# Patient Record
Sex: FEMALE | Race: WHITE | HISPANIC OR LATINO
[De-identification: names, ages, dates, MRNs, and addresses within clinical notes are randomized per-mention and may not be internally consistent; named-entity substitution may affect disease eponyms.]

---

## 2017-10-12 ENCOUNTER — RESULT REVIEW (OUTPATIENT)
Age: 32
End: 2017-10-12

## 2021-02-22 ENCOUNTER — INPATIENT (INPATIENT)
Facility: HOSPITAL | Age: 36
LOS: 1 days | Discharge: HOME | DRG: 310 | End: 2021-02-24
Attending: GENERAL PRACTICE | Admitting: GENERAL PRACTICE
Payer: COMMERCIAL

## 2021-02-22 ENCOUNTER — TRANSCRIPTION ENCOUNTER (OUTPATIENT)
Age: 36
End: 2021-02-22

## 2021-02-22 VITALS
DIASTOLIC BLOOD PRESSURE: 59 MMHG | HEIGHT: 65 IN | WEIGHT: 171.96 LBS | HEART RATE: 100 BPM | OXYGEN SATURATION: 99 % | TEMPERATURE: 98 F | SYSTOLIC BLOOD PRESSURE: 118 MMHG | RESPIRATION RATE: 18 BRPM

## 2021-02-22 DIAGNOSIS — I21.4 NON-ST ELEVATION (NSTEMI) MYOCARDIAL INFARCTION: ICD-10-CM

## 2021-02-22 DIAGNOSIS — I47.1 SUPRAVENTRICULAR TACHYCARDIA: ICD-10-CM

## 2021-02-22 DIAGNOSIS — Z29.9 ENCOUNTER FOR PROPHYLACTIC MEASURES, UNSPECIFIED: ICD-10-CM

## 2021-02-22 LAB
ALBUMIN SERPL ELPH-MCNC: 3.6 G/DL — SIGNIFICANT CHANGE UP (ref 3.5–5)
ALP SERPL-CCNC: 97 U/L — SIGNIFICANT CHANGE UP (ref 40–120)
ALT FLD-CCNC: 17 U/L DA — SIGNIFICANT CHANGE UP (ref 10–60)
AMPHET UR-MCNC: NEGATIVE — SIGNIFICANT CHANGE UP
ANION GAP SERPL CALC-SCNC: 4 MMOL/L — LOW (ref 5–17)
APPEARANCE UR: CLEAR — SIGNIFICANT CHANGE UP
AST SERPL-CCNC: 11 U/L — SIGNIFICANT CHANGE UP (ref 10–40)
BARBITURATES UR SCN-MCNC: NEGATIVE — SIGNIFICANT CHANGE UP
BASOPHILS # BLD AUTO: 0.02 K/UL — SIGNIFICANT CHANGE UP (ref 0–0.2)
BASOPHILS NFR BLD AUTO: 0.3 % — SIGNIFICANT CHANGE UP (ref 0–2)
BENZODIAZ UR-MCNC: NEGATIVE — SIGNIFICANT CHANGE UP
BILIRUB SERPL-MCNC: 0.2 MG/DL — SIGNIFICANT CHANGE UP (ref 0.2–1.2)
BILIRUB UR-MCNC: NEGATIVE — SIGNIFICANT CHANGE UP
BUN SERPL-MCNC: 14 MG/DL — SIGNIFICANT CHANGE UP (ref 7–18)
CALCIUM SERPL-MCNC: 8.2 MG/DL — LOW (ref 8.4–10.5)
CHLORIDE SERPL-SCNC: 109 MMOL/L — HIGH (ref 96–108)
CK MB BLD-MCNC: 4.3 % — HIGH (ref 0–3.5)
CK MB CFR SERPL CALC: 3.5 NG/ML — SIGNIFICANT CHANGE UP (ref 0–3.6)
CK SERPL-CCNC: 81 U/L — SIGNIFICANT CHANGE UP (ref 21–215)
CK SERPL-CCNC: 91 U/L — SIGNIFICANT CHANGE UP (ref 21–215)
CO2 SERPL-SCNC: 27 MMOL/L — SIGNIFICANT CHANGE UP (ref 22–31)
COCAINE METAB.OTHER UR-MCNC: NEGATIVE — SIGNIFICANT CHANGE UP
COLOR SPEC: YELLOW — SIGNIFICANT CHANGE UP
CREAT SERPL-MCNC: 0.62 MG/DL — SIGNIFICANT CHANGE UP (ref 0.5–1.3)
DIFF PNL FLD: NEGATIVE — SIGNIFICANT CHANGE UP
EOSINOPHIL # BLD AUTO: 0.12 K/UL — SIGNIFICANT CHANGE UP (ref 0–0.5)
EOSINOPHIL NFR BLD AUTO: 1.7 % — SIGNIFICANT CHANGE UP (ref 0–6)
GLUCOSE SERPL-MCNC: 87 MG/DL — SIGNIFICANT CHANGE UP (ref 70–99)
GLUCOSE UR QL: NEGATIVE — SIGNIFICANT CHANGE UP
HCG SERPL-ACNC: <1 MIU/ML — SIGNIFICANT CHANGE UP
HCT VFR BLD CALC: 36.8 % — SIGNIFICANT CHANGE UP (ref 34.5–45)
HGB BLD-MCNC: 11.8 G/DL — SIGNIFICANT CHANGE UP (ref 11.5–15.5)
IMM GRANULOCYTES NFR BLD AUTO: 0.3 % — SIGNIFICANT CHANGE UP (ref 0–1.5)
KETONES UR-MCNC: NEGATIVE — SIGNIFICANT CHANGE UP
LEUKOCYTE ESTERASE UR-ACNC: NEGATIVE — SIGNIFICANT CHANGE UP
LYMPHOCYTES # BLD AUTO: 2.3 K/UL — SIGNIFICANT CHANGE UP (ref 1–3.3)
LYMPHOCYTES # BLD AUTO: 32 % — SIGNIFICANT CHANGE UP (ref 13–44)
MAGNESIUM SERPL-MCNC: 2.2 MG/DL — SIGNIFICANT CHANGE UP (ref 1.6–2.6)
MCHC RBC-ENTMCNC: 27.7 PG — SIGNIFICANT CHANGE UP (ref 27–34)
MCHC RBC-ENTMCNC: 32.1 GM/DL — SIGNIFICANT CHANGE UP (ref 32–36)
MCV RBC AUTO: 86.4 FL — SIGNIFICANT CHANGE UP (ref 80–100)
METHADONE UR-MCNC: NEGATIVE — SIGNIFICANT CHANGE UP
MONOCYTES # BLD AUTO: 0.47 K/UL — SIGNIFICANT CHANGE UP (ref 0–0.9)
MONOCYTES NFR BLD AUTO: 6.5 % — SIGNIFICANT CHANGE UP (ref 2–14)
NEUTROPHILS # BLD AUTO: 4.25 K/UL — SIGNIFICANT CHANGE UP (ref 1.8–7.4)
NEUTROPHILS NFR BLD AUTO: 59.2 % — SIGNIFICANT CHANGE UP (ref 43–77)
NITRITE UR-MCNC: NEGATIVE — SIGNIFICANT CHANGE UP
NRBC # BLD: 0 /100 WBCS — SIGNIFICANT CHANGE UP (ref 0–0)
OPIATES UR-MCNC: NEGATIVE — SIGNIFICANT CHANGE UP
PCP SPEC-MCNC: SIGNIFICANT CHANGE UP
PCP UR-MCNC: NEGATIVE — SIGNIFICANT CHANGE UP
PH UR: 7 — SIGNIFICANT CHANGE UP (ref 5–8)
PLATELET # BLD AUTO: 282 K/UL — SIGNIFICANT CHANGE UP (ref 150–400)
POTASSIUM SERPL-MCNC: 3.9 MMOL/L — SIGNIFICANT CHANGE UP (ref 3.5–5.3)
POTASSIUM SERPL-SCNC: 3.9 MMOL/L — SIGNIFICANT CHANGE UP (ref 3.5–5.3)
PROT SERPL-MCNC: 7 G/DL — SIGNIFICANT CHANGE UP (ref 6–8.3)
PROT UR-MCNC: NEGATIVE — SIGNIFICANT CHANGE UP
RAPID RVP RESULT: SIGNIFICANT CHANGE UP
RBC # BLD: 4.26 M/UL — SIGNIFICANT CHANGE UP (ref 3.8–5.2)
RBC # FLD: 13.6 % — SIGNIFICANT CHANGE UP (ref 10.3–14.5)
SARS-COV-2 RNA SPEC QL NAA+PROBE: SIGNIFICANT CHANGE UP
SARS-COV-2 RNA SPEC QL NAA+PROBE: SIGNIFICANT CHANGE UP
SODIUM SERPL-SCNC: 140 MMOL/L — SIGNIFICANT CHANGE UP (ref 135–145)
SP GR SPEC: 1.01 — SIGNIFICANT CHANGE UP (ref 1.01–1.02)
T3 SERPL-MCNC: 100 NG/DL — SIGNIFICANT CHANGE UP (ref 80–200)
T4 AB SER-ACNC: 6.8 UG/DL — SIGNIFICANT CHANGE UP (ref 4.6–12)
THC UR QL: NEGATIVE — SIGNIFICANT CHANGE UP
TROPONIN I SERPL-MCNC: 0.79 NG/ML — HIGH (ref 0–0.04)
TROPONIN I SERPL-MCNC: 1.45 NG/ML — HIGH (ref 0–0.04)
TSH SERPL-MCNC: 3.59 UU/ML — SIGNIFICANT CHANGE UP (ref 0.34–4.82)
UROBILINOGEN FLD QL: NEGATIVE — SIGNIFICANT CHANGE UP
WBC # BLD: 7.18 K/UL — SIGNIFICANT CHANGE UP (ref 3.8–10.5)
WBC # FLD AUTO: 7.18 K/UL — SIGNIFICANT CHANGE UP (ref 3.8–10.5)

## 2021-02-22 PROCEDURE — 71046 X-RAY EXAM CHEST 2 VIEWS: CPT | Mod: 26

## 2021-02-22 PROCEDURE — 99285 EMERGENCY DEPT VISIT HI MDM: CPT

## 2021-02-22 RX ORDER — METOPROLOL TARTRATE 50 MG
12.5 TABLET ORAL
Refills: 0 | Status: DISCONTINUED | OUTPATIENT
Start: 2021-02-22 | End: 2021-02-24

## 2021-02-22 RX ORDER — ENOXAPARIN SODIUM 100 MG/ML
40 INJECTION SUBCUTANEOUS DAILY
Refills: 0 | Status: DISCONTINUED | OUTPATIENT
Start: 2021-02-22 | End: 2021-02-24

## 2021-02-22 RX ORDER — SODIUM CHLORIDE 9 MG/ML
1000 INJECTION INTRAMUSCULAR; INTRAVENOUS; SUBCUTANEOUS
Refills: 0 | Status: DISCONTINUED | OUTPATIENT
Start: 2021-02-22 | End: 2021-02-24

## 2021-02-22 RX ORDER — ACETAMINOPHEN 500 MG
1000 TABLET ORAL ONCE
Refills: 0 | Status: COMPLETED | OUTPATIENT
Start: 2021-02-22 | End: 2021-02-22

## 2021-02-22 RX ORDER — ATORVASTATIN CALCIUM 80 MG/1
40 TABLET, FILM COATED ORAL AT BEDTIME
Refills: 0 | Status: DISCONTINUED | OUTPATIENT
Start: 2021-02-22 | End: 2021-02-24

## 2021-02-22 RX ORDER — ASPIRIN/CALCIUM CARB/MAGNESIUM 324 MG
81 TABLET ORAL DAILY
Refills: 0 | Status: DISCONTINUED | OUTPATIENT
Start: 2021-02-22 | End: 2021-02-24

## 2021-02-22 RX ORDER — KETOROLAC TROMETHAMINE 30 MG/ML
15 SYRINGE (ML) INJECTION EVERY 6 HOURS
Refills: 0 | Status: DISCONTINUED | OUTPATIENT
Start: 2021-02-22 | End: 2021-02-24

## 2021-02-22 RX ADMIN — SODIUM CHLORIDE 70 MILLILITER(S): 9 INJECTION INTRAMUSCULAR; INTRAVENOUS; SUBCUTANEOUS at 23:51

## 2021-02-22 RX ADMIN — ATORVASTATIN CALCIUM 40 MILLIGRAM(S): 80 TABLET, FILM COATED ORAL at 22:00

## 2021-02-22 RX ADMIN — Medication 81 MILLIGRAM(S): at 22:00

## 2021-02-22 RX ADMIN — Medication 15 MILLIGRAM(S): at 18:31

## 2021-02-22 RX ADMIN — ENOXAPARIN SODIUM 40 MILLIGRAM(S): 100 INJECTION SUBCUTANEOUS at 18:31

## 2021-02-22 NOTE — ED PROVIDER NOTE - CLINICAL SUMMARY MEDICAL DECISION MAKING FREE TEXT BOX
35 year old female presenting to the ED with presumed SVT. EKG from urgent care shows sinus tachycardia at 160. Will order labs, chest x-ray, and reassess.

## 2021-02-22 NOTE — ED ADULT NURSE REASSESSMENT NOTE - NS ED NURSE REASSESS COMMENT FT1
Pt. is stable at this time. Pt. is on remote tele monitor, normal sinus rhythm. endorsed to ED hold for continued care.

## 2021-02-22 NOTE — ED ADULT NURSE NOTE - OBJECTIVE STATEMENT
Pt. c/o palpitations. Pt. stated she woke up this morning with chest pain and felt like "my heart was racing". Pt. went to [facility] where HR was 180. Pt. received Asprin and was sent to the ED.

## 2021-02-22 NOTE — ED PROVIDER NOTE - OBJECTIVE STATEMENT
35 year old female presenting with no PMHx presenting to the ED with complaints of chest pain and palpitations that started this morning when she woke up. Patient then went to urgent care and was found to be severely tachycardic to 160 bpm. ENS was called and thought that she was in SVT and vagal maneuvers were performed. According to EMS report, SVT of 180 broke down to 100. EMS also gave aspirin. Currently patient is feeling some chest discomfort. Patient states that last night she drank a bottle of wine which is atypical. Otherwise denies drug use, medication, smoking, or any other acute complaints. 35 year old female presenting with no PMHx presenting to the ED with complaints of chest pain and palpitations that started this morning when she woke up. Patient then went to urgent care and was found to be severely tachycardic to 160 bpm. ENS was called and thought that she was in SVT and vagal maneuvers were performed. According to EMS report, SVT of 180 broke down to 100. EMS also gave aspirin. Currently patient is feeling some chest discomfort. Patient states that last night she drank a bottle of wine which is atypical. Otherwise denies drug use, medication, OCP use, calf swelling, smoking, or any other acute complaints.

## 2021-02-22 NOTE — H&P ADULT - NSHPREVIEWOFSYSTEMS_GEN_ALL_CORE
GEN: no fever, no chills, no pain  RESP: no SOB at rest , no cough, no sputum  CVS: no chest pain, no palpitations now,  no edema  GI: no abdominal pain, no nausea, no vomiting, no constipation, no diarrhea  : no dysuria, no frequency, no hematuria  NEURO: no headache, no dizziness  PSYCH: no depression, not anxious  Derm : no itching, no rash

## 2021-02-22 NOTE — ED PROVIDER NOTE - CONTEXT
Patient went to urgent care and found to have SVT to 180s. EMS performed vagal maneuvers which broke SVT to 100

## 2021-02-22 NOTE — ED ADULT NURSE NOTE - CHIEF COMPLAINT QUOTE
biba sent from [facility] for eval palpitations  / tachycardia . pt c/o woke up w/ chest pains and palpitations this morning . ems reports found pt w/ XC=061/ SVT broke down to 100   after vagal maneuvers .  mg given

## 2021-02-22 NOTE — ED PROVIDER NOTE - PROGRESS NOTE DETAILS
still having chest pain. repeat EKG normal. admit to telemetry for elevated troponin, arrythmia monitoring

## 2021-02-22 NOTE — H&P ADULT - HISTORY OF PRESENT ILLNESS
34 yo Female with no PMHx presenting to the ED with complaints of chest pain and palpitations that started this morning when she woke up. Patient went to urgent care and was found to have HR of 160 bpm. EMS was called and thought that she was in SVT and vagal maneuvers were performed. According to EMS report, SVT of 180 broke down to 100. Pt continues to have some chest discomfort. Patient states that last night she drank a bottle of wine which is atypical.   Pt denies drug use, medication, OCP use, calf swelling, smoking, or any other acute complaints 34 yo Female with no PMHx presenting to the ED with complaints of chest pain and palpitations that started this morning when she woke up. Patient went to urgent care and was found to have HR of 160 bpm. EMS was called and thought that she was in SVT and vagal maneuvers were performed. According to EMS report, SVT of 180 broke down to 100. Pt continues to have some chest discomfort. Patient states that last night she drank a bottle of wine which is atypical. Pt confirms drinking >3 cups of coffee daily and being under stress at work daily.   Pt denies drug use, medication, OCP use, calf swelling, smoking, or any other acute complaints 36 yo Female with no PMHx presenting to the ED with complaints of chest pain and palpitations that started this morning when she woke up. Patient went to urgent care and was found to have HR of 160 bpm. EMS was called and thought that she was in SVT and vagal maneuvers were performed.   According to EMS report, SVT of 180 broke down to 100. Pt continues to have some chest discomfort. Patient states that last night she drank a bottle of wine which is atypical. Pt confirms drinking >3 cups of coffee daily and being under stress at work daily.   Pt denies drug use, medication, OCP use, calf swelling, smoking, or any other acute complaints

## 2021-02-22 NOTE — H&P ADULT - NSHPPHYSICALEXAM_GEN_ALL_CORE
Vital Signs Last 24 Hrs  T(C): 36.9 (22 Feb 2021 15:40), Max: 36.9 (22 Feb 2021 15:40)  T(F): 98.4 (22 Feb 2021 15:40), Max: 98.4 (22 Feb 2021 15:40)  HR: 88 (22 Feb 2021 15:40) (88 - 100)  BP: 96/60 (22 Feb 2021 15:40) (96/60 - 118/59)  BP(mean): --  RR: 18 (22 Feb 2021 15:40) (18 - 18)  SpO2: 99% (22 Feb 2021 15:40) (99% - 99%)    GENERAL: NAD, lying in bed comfortably  HEAD:  Atraumatic, Normocephalic  EYES: EOMI, PERRLA, conjunctiva and sclera clear  ENT: Moist mucous membranes  NECK: Supple, No JVD  CHEST/LUNG: Clear to auscultation bilaterally; No rales, rhonchi, wheezing, or rubs.  HEART: Regular rate and rhythm; S1+ S2+  ABDOMEN: Bowel sounds present; Soft, Nontender, Nondistended. No hepatomegaly  EXTREMITIES:  2+ Peripheral Pulses, brisk capillary refill. No clubbing, cyanosis, or edema  NERVOUS SYSTEM:  Alert & Oriented , speech clear. No deficits   MSK: FROM all 4 extremities, full and equal strength  SKIN: No rashes or lesions Vital Signs Last 24 Hrs  T(C): 36.9 (22 Feb 2021 15:40), Max: 36.9 (22 Feb 2021 15:40)  T(F): 98.4 (22 Feb 2021 15:40), Max: 98.4 (22 Feb 2021 15:40)  HR: 88 (22 Feb 2021 15:40) (88 - 100)  BP: 96/60 (22 Feb 2021 15:40) (96/60 - 118/59)  BP(mean): --  RR: 18 (22 Feb 2021 15:40) (18 - 18)  SpO2: 99% (22 Feb 2021 15:40) (99% - 99%)    GENERAL: NAD, lying in bed comfortably  HEAD:  Atraumatic, Normocephalic  EYES: EOMI, PERRLA, conjunctiva and sclera clear  ENT: Moist mucous membranes  NECK: Supple, No JVD  CHEST/LUNG: Clear to auscultation bilaterally; No rales, rhonchi, wheezing, or rubs, + chest wall tenderness on Physical exam .  HEART: Regular rate and rhythm; S1+ S2+  ABDOMEN: Bowel sounds present; Soft, Nontender, Nondistended. No hepatomegaly  EXTREMITIES:  2+ Peripheral Pulses, brisk capillary refill. No clubbing, cyanosis, or edema  NERVOUS SYSTEM:  Alert & Oriented , speech clear. No deficits   MSK: FROM all 4 extremities, full and equal strength  SKIN: No rashes or lesions

## 2021-02-22 NOTE — ED PROVIDER NOTE - CARE PLAN
Principal Discharge DX:	SVT (supraventricular tachycardia)  Secondary Diagnosis:	NSTEMI (non-ST elevated myocardial infarction)

## 2021-02-22 NOTE — H&P ADULT - NSHPSOCIALHISTORY_GEN_ALL_CORE
Alcohol: occasional use of alcohol   Smoking: Denied  Illicit drugs: Denied Alcohol: occasional use of alcohol   Smoking: Denied  Illicit drugs: Denied  Pt confirms drinking >3 cups of coffee daily and being under stress at work daily.

## 2021-02-22 NOTE — ED PROVIDER NOTE - CARDIAC, MLM
Normal rate, regular rhythm.  Heart sounds S1, S2.  No murmurs, rubs or gallops. No calf swelling or tenderness.

## 2021-02-22 NOTE — ED ADULT TRIAGE NOTE - CHIEF COMPLAINT QUOTE
biba sent from [facility] for eval palpitations  / tachycardia . pt c/o woke up w/ chest pains and palpitations this morning . ems reports found pt w/ ST=675/ SVT broke down to 100   after vagal maneuvers .  mg given

## 2021-02-22 NOTE — H&P ADULT - PROBLEM SELECTOR PLAN 2
Pt was noted to have SVT + chest discomfort   - HR as per EMS report was 180 which broke after vagal maneuver   - unknown etiology   - no concern for infection at this time   - cw telemonitoring   - plan as above   - fu cardio recommendations Pt was noted to have SVT + chest discomfort   - HR as per EMS report was 180 which broke after vagal maneuver   - unknown etiology   - no concern for infection at this time   - cw telemonitoring   - plan as above   check D Dimer   - fu cardio recommendations

## 2021-02-22 NOTE — H&P ADULT - PROBLEM SELECTOR PLAN 1
Pt presented after having palpitations and was noted to have SVT + chest discomfort   -EKG showed NSR   - ASA, Lipitor + BB intiated   - Troponins: T1: .79 check T2 and T3  - cw Telemetry monitoring  - TTE ordered  - TSH, T4 wnl fu Free T3   - Fu utox and UA   - cw toradol for pain management   - Cardiology consult Dr. Guadalupe Pt presented after having palpitations and was noted to have SVT + chest discomfort   - + chest wall tenderness on Physical exam   -EKG showed NSR   - ASA, Lipitor + BB intiated   - Troponins: T1: .79 check T2 and T3  - cw Telemetry monitoring  - TTE ordered  - TSH, T4 wnl fu Free T3   - Fu utox and UA   - cw toradol for pain management   - Cardiology consult Dr. Guadalupe

## 2021-02-22 NOTE — H&P ADULT - ATTENDING COMMENTS
Patient seen, examined, and interviewed by me, case discussed with me, chart reviewed, agree with above H/P as reviewed.  See  full note above.  Dr. ALVIN Aguilar ([phone])

## 2021-02-22 NOTE — CONSULT NOTE ADULT - ASSESSMENT
34 yo Female with no PMHx presenting to the ED with complaints of chest pain and palpitations that started this morning when she woke up. Patient went to urgent care and was found to have HR of 160 bpm. EMS was called and thought that she was in SVT and vagal maneuvers were performed. According to EMS report, SVT of 180 broke down to 100. Pt continues to have some chest discomfort. Patient states that last night she drank a bottle of wine which is atypical. Pt confirms drinking >3 cups of coffee daily and being under stress at work daily.   Pt denies drug use, medication, OCP use, calf swelling, smoking, or any other acute complaints   pt with no other  episodes of palpitation, no syncope, no fhx of sudden death, denies of drug use.  +fever and cold symptoms 2 days ago.  long RP tachycardia  echo  asa daily  esr/crp  ?covid may repeat covid/rvp test  tsh  d dimer

## 2021-02-22 NOTE — ED ADULT NURSE REASSESSMENT NOTE - NS ED NURSE REASSESS COMMENT FT1
Covering LALO Peraza. Pt received from main ED. Pt is A&OX3, ambulatory, at this time denies chest pain, no shortness of breath indicated. 18G EMS placed IV noted, area flushed and intact. Pt on tele box D, NSR.

## 2021-02-22 NOTE — CONSULT NOTE ADULT - SUBJECTIVE AND OBJECTIVE BOX
CHIEF COMPLAINT:Patient is a 35y old  Female who presents with a chief complaint of palpitations (22 Feb 2021 16:11)      HPI:  34 yo Female with no PMHx presenting to the ED with complaints of chest pain and palpitations that started this morning when she woke up. Patient went to urgent care and was found to have HR of 160 bpm. EMS was called and thought that she was in SVT and vagal maneuvers were performed. According to EMS report, SVT of 180 broke down to 100. Pt continues to have some chest discomfort. Patient states that last night she drank a bottle of wine which is atypical. Pt confirms drinking >3 cups of coffee daily and being under stress at work daily.   Pt denies drug use, medication, OCP use, calf swelling, smoking, or any other acute complaints   pt with no otherr episodes of palpitation, no syncope, no fhx of sudden death, denies of drug use.  +fever and cold symptoms 2 days ago.      PAST MEDICAL & SURGICAL HISTORY:  Pregnancy    No significant past surgical history        MEDICATIONS  (STANDING):  aspirin enteric coated 81 milliGRAM(s) Oral daily  atorvastatin 40 milliGRAM(s) Oral at bedtime  enoxaparin Injectable 40 milliGRAM(s) SubCutaneous daily  metoprolol tartrate 12.5 milliGRAM(s) Oral two times a day    MEDICATIONS  (PRN):  ketorolac   Injectable 15 milliGRAM(s) IV Push every 6 hours PRN Severe Pain (7 - 10)      FAMILY HISTORY:  No pertinent family history        SOCIAL HISTORY:    [ ] Non-smoker  [ ] Smoker  [ ] Alcohol    Allergies    No Known Allergies    Intolerances    	    REVIEW OF SYSTEMS:  CONSTITUTIONAL: + fever, no weight loss, or fatigue  EYES: No eye pain, visual disturbances, or discharge  ENT:  No difficulty hearing, tinnitus, vertigo; No sinus or throat pain  NECK: No pain or stiffness  RESPIRATORY: No cough, wheezing, chills or hemoptysis; No Shortness of Breath  CARDIOVASCULAR: No chest pain, palpitations, passing out, dizziness, or leg swelling  GASTROINTESTINAL: No abdominal or epigastric pain. No nausea, vomiting, or hematemesis; No diarrhea or constipation. No melena or hematochezia.  GENITOURINARY: No dysuria, frequency, hematuria, or incontinence  NEUROLOGICAL: No headaches, memory loss, loss of strength, numbness, or tremors  SKIN: No itching, burning, rashes, or lesions   LYMPH Nodes: No enlarged glands  ENDOCRINE: No heat or cold intolerance; No hair loss  MUSCULOSKELETAL: No joint pain or swelling; No muscle, back, or extremity pain  PSYCHIATRIC: No depression, anxiety, mood swings, or difficulty sleeping  HEME/LYMPH: No easy bruising, or bleeding gums  ALLERGY AND IMMUNOLOGIC: No hives or eczema	    [ ] All others negative	  [ ] Unable to obtain    PHYSICAL EXAM:  T(C): 36.9 (02-22-21 @ 15:40), Max: 36.9 (02-22-21 @ 15:40)  HR: 88 (02-22-21 @ 15:40) (88 - 100)  BP: 96/60 (02-22-21 @ 15:40) (96/60 - 118/59)  RR: 18 (02-22-21 @ 15:40) (18 - 18)  SpO2: 99% (02-22-21 @ 15:40) (99% - 99%)  Wt(kg): --  I&O's Summary      Appearance: Normal	  HEENT:   Normal oral mucosa, PERRL, EOMI	  Lymphatic: No lymphadenopathy  Cardiovascular: Normal S1 S2, No JVD, + murmurs, No edema  Respiratory: Lungs clear to auscultation	  Psychiatry: A & O x 3, Mood & affect appropriate  Gastrointestinal:  Soft, Non-tender, + BS	  Skin: No rashes, No ecchymoses, No cyanosis	  Neurologic: Non-focal  Extremities: Normal range of motion, No clubbing, cyanosis or edema  Vascular: Peripheral pulses palpable 2+ bilaterally    TELEMETRY: 	    ECG:  	  RADIOLOGY:  OTHER: 	  	  LABS:	 	    CARDIAC MARKERS:  CARDIAC MARKERS ( 22 Feb 2021 12:43 )  0.793 ng/mL / x     / 91 U/L / x     / x                                  11.8   7.18  )-----------( 282      ( 22 Feb 2021 12:43 )             36.8     02-22    140  |  109<H>  |  14  ----------------------------<  87  3.9   |  27  |  0.62    Ca    8.2<L>      22 Feb 2021 12:43  Mg     2.2     02-22    TPro  7.0  /  Alb  3.6  /  TBili  0.2  /  DBili  x   /  AST  11  /  ALT  17  /  AlkPhos  97  02-22    proBNP:   Lipid Profile:   HgA1c:   TSH: Thyroid Stimulating Hormone, Serum: 3.59 uU/mL (02-22 @ 12:43)        PREVIOUS DIAGNOSTIC TESTING:       < from: Xray Chest 2 Views PA/Lat (02.22.21 @ 14:20) >  Noconsolidation or infiltrate.  No pleural effusion.    Heart size within normal limits.    COVID-19 PCR . (02.22.21 @ 14:10)    COVID-19 PCR: NotDetec: EUA/IVD  You can help in the fight against COVID-19. [employer] may contact  you to see if you are interested in voluntarily participating in one of  our clinical trials.  This test has been validated by [employer] to be accurate;  though it has not been FDA cleared/approved by the usual pathway  As with all laboratory test, results should be correlated with clinical  findings.  https://www.fda.gov/media/424363/download  https://www.fda.gov/media/560331/download      Troponin I, Serum (02.22.21 @ 12:43)    Troponin I, Serum: 0.793: The new reference range for Troponin-I performed on the Siemens Vista  system is 0.015-0.045 ng/mL, which includes the 99th percentile of a  healthy reference population. Studies have shown that elevated troponin  levels above the 99th percentile cutoff are associated with an increased  risk for adverse cardiac events, with the risk increasing as troponin  levels increase. As per a joint committee of the American College of  Cardiology and European Society of Cardiology, diagnosis of classic MI is  based upon the detection of a rise or fall of cardiac troponin values,  with at least one value above the 99th percentile upper reference limit,  in the appropriate clinical context.  Troponin-I (ng/mL) Interpretation  0.00-0.045 Normal range (includes the 99th percentile of a healthy  reference population)  >0.045 Elevated troponin level indicating increased risk  Note: Troponin-I and Troponin-T cannot be used interchangeably in serial  measurements. Minimally elevated Troponin results should be interpreted  in the context of clinical findings and risk factors. ng/mL

## 2021-02-23 ENCOUNTER — TRANSCRIPTION ENCOUNTER (OUTPATIENT)
Age: 36
End: 2021-02-23

## 2021-02-23 LAB
A1C WITH ESTIMATED AVERAGE GLUCOSE RESULT: 5 % — SIGNIFICANT CHANGE UP (ref 4–5.6)
ANION GAP SERPL CALC-SCNC: 7 MMOL/L — SIGNIFICANT CHANGE UP (ref 5–17)
BUN SERPL-MCNC: 15 MG/DL — SIGNIFICANT CHANGE UP (ref 7–18)
CALCIUM SERPL-MCNC: 7.9 MG/DL — LOW (ref 8.4–10.5)
CHLORIDE SERPL-SCNC: 107 MMOL/L — SIGNIFICANT CHANGE UP (ref 96–108)
CHOLEST SERPL-MCNC: 130 MG/DL — SIGNIFICANT CHANGE UP
CO2 SERPL-SCNC: 26 MMOL/L — SIGNIFICANT CHANGE UP (ref 22–31)
CREAT SERPL-MCNC: 0.65 MG/DL — SIGNIFICANT CHANGE UP (ref 0.5–1.3)
CRP SERPL-MCNC: <0.1 MG/DL — SIGNIFICANT CHANGE UP (ref 0–0.4)
D DIMER BLD IA.RAPID-MCNC: 201 NG/ML DDU — SIGNIFICANT CHANGE UP
D DIMER BLD IA.RAPID-MCNC: <150 NG/ML DDU — SIGNIFICANT CHANGE UP
ERYTHROCYTE [SEDIMENTATION RATE] IN BLOOD: 6 MM/HR — SIGNIFICANT CHANGE UP (ref 0–15)
ESTIMATED AVERAGE GLUCOSE: 97 MG/DL — SIGNIFICANT CHANGE UP (ref 68–114)
FOLATE SERPL-MCNC: 16.1 NG/ML — SIGNIFICANT CHANGE UP
GLUCOSE SERPL-MCNC: 138 MG/DL — HIGH (ref 70–99)
HCT VFR BLD CALC: 33 % — LOW (ref 34.5–45)
HDLC SERPL-MCNC: 51 MG/DL — SIGNIFICANT CHANGE UP
HGB BLD-MCNC: 10.7 G/DL — LOW (ref 11.5–15.5)
LIPID PNL WITH DIRECT LDL SERPL: 59 MG/DL — SIGNIFICANT CHANGE UP
MAGNESIUM SERPL-MCNC: 2.1 MG/DL — SIGNIFICANT CHANGE UP (ref 1.6–2.6)
MCHC RBC-ENTMCNC: 28.4 PG — SIGNIFICANT CHANGE UP (ref 27–34)
MCHC RBC-ENTMCNC: 32.4 GM/DL — SIGNIFICANT CHANGE UP (ref 32–36)
MCV RBC AUTO: 87.5 FL — SIGNIFICANT CHANGE UP (ref 80–100)
NON HDL CHOLESTEROL: 79 MG/DL — SIGNIFICANT CHANGE UP
NRBC # BLD: 0 /100 WBCS — SIGNIFICANT CHANGE UP (ref 0–0)
PHOSPHATE SERPL-MCNC: 3 MG/DL — SIGNIFICANT CHANGE UP (ref 2.5–4.5)
PLATELET # BLD AUTO: 230 K/UL — SIGNIFICANT CHANGE UP (ref 150–400)
POTASSIUM SERPL-MCNC: 3.9 MMOL/L — SIGNIFICANT CHANGE UP (ref 3.5–5.3)
POTASSIUM SERPL-SCNC: 3.9 MMOL/L — SIGNIFICANT CHANGE UP (ref 3.5–5.3)
RBC # BLD: 3.77 M/UL — LOW (ref 3.8–5.2)
RBC # FLD: 14 % — SIGNIFICANT CHANGE UP (ref 10.3–14.5)
SARS-COV-2 IGG SERPL QL IA: NEGATIVE — SIGNIFICANT CHANGE UP
SARS-COV-2 IGM SERPL IA-ACNC: <0.1 INDEX — SIGNIFICANT CHANGE UP
SODIUM SERPL-SCNC: 140 MMOL/L — SIGNIFICANT CHANGE UP (ref 135–145)
TRIGL SERPL-MCNC: 98 MG/DL — SIGNIFICANT CHANGE UP
TROPONIN I SERPL-MCNC: 1.14 NG/ML — HIGH (ref 0–0.04)
TROPONIN I SERPL-MCNC: 1.36 NG/ML — HIGH (ref 0–0.04)
TSH SERPL-MCNC: 3.9 UU/ML — SIGNIFICANT CHANGE UP (ref 0.34–4.82)
VIT B12 SERPL-MCNC: 460 PG/ML — SIGNIFICANT CHANGE UP (ref 232–1245)
WBC # BLD: 4.58 K/UL — SIGNIFICANT CHANGE UP (ref 3.8–10.5)
WBC # FLD AUTO: 4.58 K/UL — SIGNIFICANT CHANGE UP (ref 3.8–10.5)

## 2021-02-23 RX ORDER — OXYMETAZOLINE HYDROCHLORIDE 0.5 MG/ML
2 SPRAY NASAL
Refills: 0 | Status: DISCONTINUED | OUTPATIENT
Start: 2021-02-23 | End: 2021-02-24

## 2021-02-23 RX ADMIN — Medication 600 MILLIGRAM(S): at 17:40

## 2021-02-23 RX ADMIN — SODIUM CHLORIDE 70 MILLILITER(S): 9 INJECTION INTRAMUSCULAR; INTRAVENOUS; SUBCUTANEOUS at 11:58

## 2021-02-23 RX ADMIN — OXYMETAZOLINE HYDROCHLORIDE 2 SPRAY(S): 0.5 SPRAY NASAL at 18:34

## 2021-02-23 RX ADMIN — SODIUM CHLORIDE 70 MILLILITER(S): 9 INJECTION INTRAMUSCULAR; INTRAVENOUS; SUBCUTANEOUS at 22:07

## 2021-02-23 RX ADMIN — Medication 400 MILLIGRAM(S): at 00:06

## 2021-02-23 RX ADMIN — ENOXAPARIN SODIUM 40 MILLIGRAM(S): 100 INJECTION SUBCUTANEOUS at 11:58

## 2021-02-23 RX ADMIN — SODIUM CHLORIDE 70 MILLILITER(S): 9 INJECTION INTRAMUSCULAR; INTRAVENOUS; SUBCUTANEOUS at 17:59

## 2021-02-23 RX ADMIN — Medication 15 MILLIGRAM(S): at 08:31

## 2021-02-23 RX ADMIN — ATORVASTATIN CALCIUM 40 MILLIGRAM(S): 80 TABLET, FILM COATED ORAL at 22:05

## 2021-02-23 RX ADMIN — Medication 81 MILLIGRAM(S): at 11:58

## 2021-02-23 NOTE — DISCHARGE NOTE PROVIDER - NSDCMRMEDTOKEN_GEN_ALL_CORE_FT
aspirin 81 mg oral delayed release tablet: 1 tab(s) orally once a day   acetaminophen 325 mg oral tablet: 2 tab(s) orally every 6 hours, As needed, Temp greater or equal to 38C (100.4F), Mild Pain (1 - 3)  aspirin 81 mg oral delayed release tablet: 1 tab(s) orally once a day

## 2021-02-23 NOTE — PROGRESS NOTE ADULT - PROBLEM SELECTOR PLAN 2
Pt was noted to have SVT + chest discomfort   - HR as per EMS report was 180 which broke after vagal maneuver   - unknown etiology   - no concern for infection at this time   - cw telemonitoring   - plan as above   check D Dimer   - fu cardio recommendations SVT to 180s, now HR 70s  - no concern for infection at this time   - cw telemonitoring   - plan as above   - DDimer neg  - fu cardio recommendations

## 2021-02-23 NOTE — PROGRESS NOTE ADULT - PROBLEM SELECTOR PLAN 3
IMPROVE VTE Individual Risk Assessment  RISK                                                         Points  [  ] Previous VTE                                      3  [  ] Thrombophilia                                   2  [  ] Lower limb paralysis                         2 (unable to hold up >15 seconds)    [  ] Current Cancer                                  2       (within 6 months)  [  ] Immobilization > 24 hrs                    1  [  ] ICU/CCU stay > 24 hrs                         1  [  ] Age > 60                                              1  cw lovenox for dvt ppx IMPROVE VTE Individual Risk Assessment  RISK                                                         Points  [  ] Previous VTE                                      3  [  ] Thrombophilia                                   2  [  ] Lower limb paralysis                         2 (unable to hold up >15 seconds)    [  ] Current Cancer                                  2       (within 6 months)  [  ] Immobilization > 24 hrs                    1  [  ] ICU/CCU stay > 24 hrs                         1  [  ] Age > 60                                              1  DVT ppx: Subq Lovenox  GI ppx: Not indicated  Diet: Regular  Electrolytes replaced PRN  Dispo: Home  FULL CODE

## 2021-02-23 NOTE — DISCHARGE NOTE PROVIDER - CARE PROVIDER_API CALL
Erasto Guadalupe  CARDIOVASCULAR DISEASE  [address], [address]  [place] [address]  Phone: [phone]  Fax: [phone]  Follow Up Time: 2 weeks

## 2021-02-23 NOTE — DISCHARGE NOTE PROVIDER - HOSPITAL COURSE
: 34 yo Female with no PMHx presenting to the ED with complaints of chest pain and palpitations that started this morning when she woke up. Patient went to urgent care and was found to have HR of 160 bpm. EMS was called and thought that she was in SVT and vagal maneuvers were performed.   According to EMS report, SVT of 180 broke down to 100. Pt continues to have some chest discomfort. Patient states that last night she drank a bottle of wine which is atypical. Pt confirms drinking >3 cups of coffee daily and being under stress at work daily.   Pt denies drug use, medication, OCP use, calf swelling, smoking, or any other acute complaints    Found to have elevated troponins that peaked at 1.4 with no EKG changes. EKG NSR.   Patient monitored on telemetry with HR stable in 70s and no acute events.  Pt feeling well reports resolution of palpitations. Has not received any BB 2/2 to borderline BPs.    Still c/o chest pain with palpation over L chest.   ECHOcardiogram of heart showed  TSH, T3, T4, Ddimer, ESR, CRP were all wnl. Drug screen negative.  COVID PCR neg x2. CXR clear, UA negative. No concern for infection.     Cardiology recommended   Pt medically stable for discharge. Case discussed with attending. : 36 yo Female with no PMHx presenting to the ED with complaints of chest pain and palpitations that started this morning when she woke up. Patient went to urgent care and was found to have HR of 160 bpm. EMS was called and thought that she was in SVT and vagal maneuvers were performed.   According to EMS report, SVT of 180 broke down to 100. Pt continues to have some chest discomfort. Patient states that last night she drank a bottle of wine which is atypical. Pt confirms drinking >3 cups of coffee daily and being under stress at work daily.   Pt denies drug use, medication, OCP use, calf swelling, smoking, or any other acute complaints    Found to have elevated troponins that peaked at 1.4 with no EKG changes. EKG NSR.   Patient monitored on telemetry with HR stable in 70s and no acute events.  Pt feeling well reports resolution of palpitations. Has not received any BB 2/2 to borderline BPs.    Still c/o chest pain with palpation over L chest.   ECHOcardiogram of heart showed XXX  TSH, T3, T4, Ddimer, ESR, CRP were all wnl. Drug screen negative.  COVID PCR neg x2. CXR clear, UA negative. No concern for infection.     Cardiology recommended ASA, statin BB? and outpt?  Pt medically stable for discharge. Case discussed with attending. : 36 yo Female with no PMHx presenting to the ED with complaints of chest pain and palpitations that started this morning when she woke up. Patient went to urgent care and was found to have HR of 160 bpm. EMS was called and thought that she was in SVT and vagal maneuvers were performed.   According to EMS report, SVT of 180 broke down to 100. Pt continues to have some chest discomfort. Patient states that last night she drank a bottle of wine which is atypical. Pt confirms drinking >3 cups of coffee daily and being under stress at work daily.   Pt denies drug use, medication, OCP use, calf swelling, smoking, or any other acute complaints    Found to have elevated troponins that peaked at 1.4 with no EKG changes. EKG NSR.   Patient monitored on telemetry with HR stable in 70s and no acute events.  Pt feeling well reports resolution of palpitations. Has not received any BB 2/2 to borderline BPs.    Still c/o chest pain with palpation over L chest.   ECHOcardiogram of heart showed trace MR otherwise normal EF 55%.   TSH, T3, T4, Ddimer, ESR, CRP were all wnl. Drug screen negative.  COVID PCR neg x2. CXR clear, UA negative. No concern for infection.     Cardiology recommended ASA and outpatient f/u.   Pt medically stable for discharge. Case discussed with attending. : 36 yo Female with no PMHx presenting to the ED with complaints of chest pain and palpitations that started this morning when she woke up. Patient went to urgent care and was found to have HR of 160 bpm. EMS was called and thought that she was in SVT and vagal maneuvers were performed.   According to EMS report, SVT of 180 broke down to 100. Pt continues to have some chest discomfort. Patient states that last night she drank a bottle of wine which is atypical. Pt confirms drinking >3 cups of coffee daily and being under stress at work daily.   Pt denies drug use, medication, OCP use, calf swelling, smoking, or any other acute complaints    Found to have elevated troponins that peaked at 1.4 with no EKG changes. EKG NSR.   Patient monitored on telemetry with HR stable in 70s and no acute events.  Pt feeling well reports resolution of palpitations. Has not received any BB 2/2 to borderline BPs.    Still c/o chest pain with palpation over L chest.   ECHOcardiogram of heart showed trace MR otherwise normal EF 55%.   TSH, T3, T4, Ddimer, ESR, CRP were all wnl. Drug screen negative.  COVID PCR neg x2. CXR clear, UA negative. No concern for infection.     Cardiology recommended ASA and outpatient f/u.   Pt still w/ residual CP w/ palpation likely Costochondritis, will send with pain control.   Pt medically stable for discharge. Case discussed with attending.

## 2021-02-23 NOTE — PROGRESS NOTE ADULT - PROBLEM SELECTOR PLAN 1
Pt presented after having palpitations and was noted to have SVT + chest discomfort   - + chest wall tenderness on Physical exam   -EKG showed NSR   - ASA, Lipitor + BB intiated   - Troponins: T1: .79 check T2 and T3  - cw Telemetry monitoring  - TTE ordered  - TSH, T4 wnl fu Free T3   - Fu utox and UA   - cw toradol for pain management   - Cardiology consult Dr. Guadalupe SVT to 180s   - + chest wall tenderness on Physical exam   -EKG showed NSR   - Started on ASA, statin, BB  - Troponins peaked at 1.4 and downtrended  - cw Telemetry monitoring - HR controled 70s  - TTE ordered  - TSH, T4 wnl fu Free T3   - Utox and UA neg  - cw toradol for pain management   - Cardiology consult Dr. Guadalupe

## 2021-02-23 NOTE — DISCHARGE NOTE PROVIDER - NSDCCPCAREPLAN_GEN_ALL_CORE_FT
PRINCIPAL DISCHARGE DIAGNOSIS  Diagnosis: SVT (supraventricular tachycardia)  Assessment and Plan of Treatment: You came in for palpitations. They resolved after coming to the hospital. EKG showed normal sinus rhythm. Your heart was monitored on telemetry showing no events and normal heart rate. Thyroid function tests were normal. Chest xray and urine studies were negative for infection. ECHOcardiogram of the heart showed normal ejection fraction of 55%. You were seen by cardiologist who recommended outpatient followup.      SECONDARY DISCHARGE DIAGNOSES  Diagnosis: NSTEMI (non-ST elevated myocardial infarction)  Assessment and Plan of Treatment: You had elevated troponin level to 1.45 showing evidence of heart damage. This was likely from your palpitations. Take aspirin and statin as prescribed.     PRINCIPAL DISCHARGE DIAGNOSIS  Diagnosis: SVT (supraventricular tachycardia)  Assessment and Plan of Treatment: You came in for palpitations. They resolved after coming to the hospital. EKG showed normal sinus rhythm. Your heart was monitored on telemetry showing no events and normal heart rate. Thyroid function tests were normal. Chest xray and urine studies were negative for infection. ECHOcardiogram of the heart showed??? You were seen by cardiologist who recommended outpatient followup.      SECONDARY DISCHARGE DIAGNOSES  Diagnosis: NSTEMI (non-ST elevated myocardial infarction)  Assessment and Plan of Treatment: You had elevated troponin level to 1.45 showing evidence of heart damage. This was likely from your palpitations. Take aspirin and statin as prescribed??? BB??     PRINCIPAL DISCHARGE DIAGNOSIS  Diagnosis: SVT (supraventricular tachycardia)  Assessment and Plan of Treatment: You came in for palpitations. They resolved after coming to the hospital. EKG showed normal sinus rhythm. Your heart was monitored on telemetry showing no events and normal heart rate. Thyroid function tests were normal. Chest xray and urine studies were negative for infection. ECHOcardiogram of the heart showed normal ejection fraction 55%. You were seen by cardiologist who recommended outpatient followup. Take baby aspirin daily to prevent heart disease.      SECONDARY DISCHARGE DIAGNOSES  Diagnosis: NSTEMI (non-ST elevated myocardial infarction)  Assessment and Plan of Treatment: You had elevated troponin level to 1.45 showing evidence of heart damage. This was likely from your palpitations. Take aspirin as prescribed.     PRINCIPAL DISCHARGE DIAGNOSIS  Diagnosis: SVT (supraventricular tachycardia)  Assessment and Plan of Treatment: You came in for palpitations. They resolved after coming to the hospital. EKG showed normal sinus rhythm. Your heart was monitored on telemetry showing no events and normal heart rate. Thyroid function tests were normal. Chest xray and urine studies were negative for infection. ECHOcardiogram of the heart showed normal ejection fraction 55%. You were seen by cardiologist who recommended outpatient followup in 1-2 weeks. Take baby aspirin daily to prevent heart disease.      SECONDARY DISCHARGE DIAGNOSES  Diagnosis: Costochondritis  Assessment and Plan of Treatment: Your remaining chest pain is most likely from muscle sprain. Take tylenol, ibuprofen, or naproxen as needed.    Diagnosis: NSTEMI (non-ST elevated myocardial infarction)  Assessment and Plan of Treatment: You had elevated troponin level to 1.45 showing evidence of heart damage. This was likely from your palpitations. Take aspirin as prescribed.

## 2021-02-23 NOTE — PROGRESS NOTE ADULT - SUBJECTIVE AND OBJECTIVE BOX
CARDIOLOGY     PROGRESS  NOTE   ________________________________________________    CHIEF COMPLAINT:Patient is a 35y old  Female who presents with a chief complaint of palpitations (22 Feb 2021 18:46)  no complain  	  REVIEW OF SYSTEMS:  CONSTITUTIONAL: No fever, weight loss, or fatigue  EYES: No eye pain, visual disturbances, or discharge  ENT:  No difficulty hearing, tinnitus, vertigo; No sinus or throat pain  NECK: No pain or stiffness  RESPIRATORY: No cough, wheezing, chills or hemoptysis; No Shortness of Breath  CARDIOVASCULAR: No chest pain, palpitations, passing out, dizziness, or leg swelling  GASTROINTESTINAL: No abdominal or epigastric pain. No nausea, vomiting, or hematemesis; No diarrhea or constipation. No melena or hematochezia.  GENITOURINARY: No dysuria, frequency, hematuria, or incontinence  NEUROLOGICAL: No headaches, memory loss, loss of strength, numbness, or tremors  SKIN: No itching, burning, rashes, or lesions   LYMPH Nodes: No enlarged glands  ENDOCRINE: No heat or cold intolerance; No hair loss  MUSCULOSKELETAL: No joint pain or swelling; No muscle, back, or extremity pain  PSYCHIATRIC: No depression, anxiety, mood swings, or difficulty sleeping  HEME/LYMPH: No easy bruising, or bleeding gums  ALLERGY AND IMMUNOLOGIC: No hives or eczema	    [ ] All others negative	  [ ] Unable to obtain    PHYSICAL EXAM:  T(C): 36.5 (02-23-21 @ 07:29), Max: 36.9 (02-22-21 @ 15:40)  HR: 65 (02-23-21 @ 07:29) (64 - 100)  BP: 101/56 (02-23-21 @ 07:29) (93/56 - 118/59)  RR: 16 (02-23-21 @ 07:29) (16 - 18)  SpO2: 100% (02-23-21 @ 07:29) (99% - 100%)  Wt(kg): --  I&O's Summary    22 Feb 2021 07:01  -  23 Feb 2021 07:00  --------------------------------------------------------  IN: 560 mL / OUT: 0 mL / NET: 560 mL        Appearance: Normal	  HEENT:   Normal oral mucosa, PERRL, EOMI	  Lymphatic: No lymphadenopathy  Cardiovascular: Normal S1 S2, No JVD, No murmurs, No edema  Respiratory: Lungs clear to auscultation	  Psychiatry: A & O x 3, Mood & affect appropriate  Gastrointestinal:  Soft, Non-tender, + BS	  Skin: No rashes, No ecchymoses, No cyanosis	  Neurologic: Non-focal  Extremities: Normal range of motion, No clubbing, cyanosis or edema  Vascular: Peripheral pulses palpable 2+ bilaterally    MEDICATIONS  (STANDING):  aspirin enteric coated 81 milliGRAM(s) Oral daily  atorvastatin 40 milliGRAM(s) Oral at bedtime  enoxaparin Injectable 40 milliGRAM(s) SubCutaneous daily  guaiFENesin  milliGRAM(s) Oral every 12 hours  metoprolol tartrate 12.5 milliGRAM(s) Oral two times a day  oxymetazoline 0.05% Nasal Spray 2 Spray(s) Both Nostrils two times a day  sodium chloride 0.9%. 1000 milliLiter(s) (70 mL/Hr) IV Continuous <Continuous>      TELEMETRY: 	    ECG:  	  RADIOLOGY:  OTHER: 	  	  LABS:	 	    CARDIAC MARKERS:  CARDIAC MARKERS ( 23 Feb 2021 00:06 )  1.360 ng/mL / x     / x     / x     / x      CARDIAC MARKERS ( 22 Feb 2021 18:52 )  1.450 ng/mL / x     / 81 U/L / x     / 3.5 ng/mL  CARDIAC MARKERS ( 22 Feb 2021 12:43 )  0.793 ng/mL / x     / 91 U/L / x     / x                                    10.7   4.58  )-----------( 230      ( 23 Feb 2021 09:13 )             33.0     02-23    140  |  107  |  15  ----------------------------<  138<H>  3.9   |  26  |  x     Ca    7.9<L>      23 Feb 2021 09:13  Mg     2.1     02-23    TPro  7.0  /  Alb  3.6  /  TBili  0.2  /  DBili  x   /  AST  11  /  ALT  17  /  AlkPhos  97  02-22    proBNP:   Lipid Profile:   HgA1c:   TSH: Thyroid Stimulating Hormone, Serum: 3.59 uU/mL (02-22 @ 12:43)    D-Dimer Assay, Quantitative (02.23.21 @ 09:13)    D-Dimer Assay, Quantitative: <150 ng/mL DDU          Assessment and plan  ---------------------------  36 yo Female with no PMHx presenting to the ED with complaints of chest pain and palpitations that started this morning when she woke up. Patient went to urgent care and was found to have HR of 160 bpm. EMS was called and thought that she was in SVT and vagal maneuvers were performed. According to EMS report, SVT of 180 broke down to 100. Pt continues to have some chest discomfort. Patient states that last night she drank a bottle of wine which is atypical. Pt confirms drinking >3 cups of coffee daily and being under stress at work daily.   Pt denies drug use, medication, OCP use, calf swelling, smoking, or any other acute complaints   pt with no other  episodes of palpitation, no syncope, no fhx of sudden death, denies of drug use.  +fever and cold symptoms 2 days ago.  long RP tachycardia  echo  asa daily  esr/crp  ?covid may repeat covid/rvp test  tsh  d dimer negative  mild increase trop, cpk normal, awaiting echo  pt with tenderness on palpation on acw yesterday    	                    CARDIOLOGY     PROGRESS  NOTE   ________________________________________________    CHIEF COMPLAINT:Patient is a 35y old  Female who presents with a chief complaint of palpitations (22 Feb 2021 18:46)  no complain  	  REVIEW OF SYSTEMS:  CONSTITUTIONAL: No fever, weight loss, or fatigue  EYES: No eye pain, visual disturbances, or discharge  ENT:  No difficulty hearing, tinnitus, vertigo; No sinus or throat pain  NECK: No pain or stiffness  RESPIRATORY: No cough, wheezing, chills or hemoptysis; No Shortness of Breath  CARDIOVASCULAR: No chest pain, palpitations, passing out, dizziness, or leg swelling  GASTROINTESTINAL: No abdominal or epigastric pain. No nausea, vomiting, or hematemesis; No diarrhea or constipation. No melena or hematochezia.  GENITOURINARY: No dysuria, frequency, hematuria, or incontinence  NEUROLOGICAL: No headaches, memory loss, loss of strength, numbness, or tremors  SKIN: No itching, burning, rashes, or lesions   LYMPH Nodes: No enlarged glands  ENDOCRINE: No heat or cold intolerance; No hair loss  MUSCULOSKELETAL: No joint pain or swelling; No muscle, back, or extremity pain  PSYCHIATRIC: No depression, anxiety, mood swings, or difficulty sleeping  HEME/LYMPH: No easy bruising, or bleeding gums  ALLERGY AND IMMUNOLOGIC: No hives or eczema	    [ ] All others negative	  [ ] Unable to obtain    PHYSICAL EXAM:  T(C): 36.5 (02-23-21 @ 07:29), Max: 36.9 (02-22-21 @ 15:40)  HR: 65 (02-23-21 @ 07:29) (64 - 100)  BP: 101/56 (02-23-21 @ 07:29) (93/56 - 118/59)  RR: 16 (02-23-21 @ 07:29) (16 - 18)  SpO2: 100% (02-23-21 @ 07:29) (99% - 100%)  Wt(kg): --  I&O's Summary    22 Feb 2021 07:01  -  23 Feb 2021 07:00  --------------------------------------------------------  IN: 560 mL / OUT: 0 mL / NET: 560 mL        Appearance: Normal	  HEENT:   Normal oral mucosa, PERRL, EOMI	  Lymphatic: No lymphadenopathy  Cardiovascular: Normal S1 S2, No JVD, No murmurs, No edema  Respiratory: Lungs clear to auscultation	  Psychiatry: A & O x 3, Mood & affect appropriate  Gastrointestinal:  Soft, Non-tender, + BS	  Skin: No rashes, No ecchymoses, No cyanosis	  Neurologic: Non-focal  Extremities: Normal range of motion, No clubbing, cyanosis or edema  Vascular: Peripheral pulses palpable 2+ bilaterally    MEDICATIONS  (STANDING):  aspirin enteric coated 81 milliGRAM(s) Oral daily  atorvastatin 40 milliGRAM(s) Oral at bedtime  enoxaparin Injectable 40 milliGRAM(s) SubCutaneous daily  guaiFENesin  milliGRAM(s) Oral every 12 hours  metoprolol tartrate 12.5 milliGRAM(s) Oral two times a day  oxymetazoline 0.05% Nasal Spray 2 Spray(s) Both Nostrils two times a day  sodium chloride 0.9%. 1000 milliLiter(s) (70 mL/Hr) IV Continuous <Continuous>      TELEMETRY: 	    ECG:  	  RADIOLOGY:  OTHER: 	  	  LABS:	 	    CARDIAC MARKERS:  CARDIAC MARKERS ( 23 Feb 2021 00:06 )  1.360 ng/mL / x     / x     / x     / x      CARDIAC MARKERS ( 22 Feb 2021 18:52 )  1.450 ng/mL / x     / 81 U/L / x     / 3.5 ng/mL  CARDIAC MARKERS ( 22 Feb 2021 12:43 )  0.793 ng/mL / x     / 91 U/L / x     / x                                    10.7   4.58  )-----------( 230      ( 23 Feb 2021 09:13 )             33.0     02-23    140  |  107  |  15  ----------------------------<  138<H>  3.9   |  26  |  x     Ca    7.9<L>      23 Feb 2021 09:13  Mg     2.1     02-23    TPro  7.0  /  Alb  3.6  /  TBili  0.2  /  DBili  x   /  AST  11  /  ALT  17  /  AlkPhos  97  02-22    proBNP:   Lipid Profile:   HgA1c:   TSH: Thyroid Stimulating Hormone, Serum: 3.59 uU/mL (02-22 @ 12:43)    D-Dimer Assay, Quantitative (02.23.21 @ 09:13)    D-Dimer Assay, Quantitative: <150 ng/mL DDU          Assessment and plan  ---------------------------  36 yo Female with no PMHx presenting to the ED with complaints of chest pain and palpitations that started this morning when she woke up. Patient went to urgent care and was found to have HR of 160 bpm. EMS was called and thought that she was in SVT and vagal maneuvers were performed. According to EMS report, SVT of 180 broke down to 100. Pt continues to have some chest discomfort. Patient states that last night she drank a bottle of wine which is atypical. Pt confirms drinking >3 cups of coffee daily and being under stress at work daily.   Pt denies drug use, medication, OCP use, calf swelling, smoking, or any other acute complaints   pt with no other  episodes of palpitation, no syncope, no fhx of sudden death, denies of drug use.  +fever and cold symptoms 2 days ago.  long RP tachycardia  echo  asa daily  esr/crp  ?covid may repeat covid/rvp test  tsh  d dimer negative  mild increase trop, cpk normal, awaiting echo  pt with tenderness on palpation on acw yesterday  awaiting echo

## 2021-02-23 NOTE — PROGRESS NOTE ADULT - SUBJECTIVE AND OBJECTIVE BOX
PGY 1 Note discussed with supervising resident and primary attending  PGY-1: Flakita Anaya MD  PAGER #: [phone] TILL 5:00 PM  Please contact On Call team 5PM - 8:30PM  Please contact [facility] team 8:30PM - 7:30AM  Patient is a 35y old  Female who presents with a chief complaint of palpitations (2021 09:56)    Brief Hospital Course: 36 yo Female with no PMHx presenting to the ED with complaints of chest pain and palpitations that started this morning when she woke up. Patient went to urgent care and was found to have HR of 160 bpm. EMS was called and thought that she was in SVT and vagal maneuvers were performed.   According to EMS report, SVT of 180 broke down to 100. Pt continues to have some chest discomfort. Patient states that last night she drank a bottle of wine which is atypical. Pt confirms drinking >3 cups of coffee daily and being under stress at work daily.   Pt denies drug use, medication, OCP use, calf swelling, smoking, or any other acute complaints    INTERVAL HPI/OVERNIGHT EVENTS: No acute events noted overnight. Pt feeling well reports resolution of palpitations. Has not recieved any BB 2/2 to hypotension.  HR 70s on telemetry. Still c/o chest pain with palpation over L chest. Also c/o sinus congestion - mucinex and afrin spray offered.     MEDICATIONS  (STANDING):  aspirin enteric coated 81 milliGRAM(s) Oral daily  atorvastatin 40 milliGRAM(s) Oral at bedtime  enoxaparin Injectable 40 milliGRAM(s) SubCutaneous daily  guaiFENesin  milliGRAM(s) Oral every 12 hours  metoprolol tartrate 12.5 milliGRAM(s) Oral two times a day  oxymetazoline 0.05% Nasal Spray 2 Spray(s) Both Nostrils two times a day  sodium chloride 0.9%. 1000 milliLiter(s) (70 mL/Hr) IV Continuous <Continuous>    MEDICATIONS  (PRN):  ketorolac   Injectable 15 milliGRAM(s) IV Push every 6 hours PRN Severe Pain (7 - 10)      __________________________________________________  REVIEW OF SYSTEMS:  CONSTITUTIONAL: No fever, weight loss, or fatigue  RESPIRATORY: +Sinus congestion No cough, wheezing, chills or hemoptysis; No shortness of breath  CARDIOVASCULAR: +Chest pain No palpitations, dizziness, or leg swelling  GASTROINTESTINAL: No abdominal pain. No nausea, vomiting, or hematemesis; No diarrhea or constipation. No melena or hematochezia.  GENITOURINARY: No dysuria or hematuria, no burning micturition, no polyuria, no urinary hesitancy, no nocturia, normal urinary frequency  NEUROLOGICAL: No headaches, memory loss, loss of strength, numbness, or tremors  SKIN: No itching, burning, rashes, or lesions   All other ROS negative except noted above    Vital Signs Last 24 Hrs  T(C): 36.5 (2021 07:29), Max: 36.9 (2021 15:40)  T(F): 97.7 (2021 07:29), Max: 98.4 (2021 15:40)  HR: 65 (2021 07:29) (64 - 100)  BP: 101/56 (2021 07:29) (93/56 - 118/59)  BP(mean): 74 (2021 19:00) (74 - 74)  RR: 16 (2021 07:29) (16 - 18)  SpO2: 100% (2021 07:29) (99% - 100%)    ________________________________________________  PHYSICAL EXAMINATION:  GENERAL: NAD, well-developed  HEAD:  Atraumatic, Normocephalic  EYES:  conjunctiva and sclera clear  NECK: Supple, No JVD, Normal thyroid  CHEST/LUNG: TTP over L chest Clear to auscultation bilaterally; No rales, rhonchi, wheezing, or rubs  HEART: Regular rate and rhythm; No murmurs, rubs, or gallops  ABDOMEN: Soft, Nontender, Nondistended; Bowel sounds present  NERVOUS SYSTEM:  Alert & Oriented X3,  Moving all 4 extremities  EXTREMITIES:  Peripheral pulses palpable. No clubbing, cyanosis, or edema  SKIN: Warm, Dry, no rashes or lesions    _________________________________________________  LABS:                        10.7   4.58  )-----------( 230      ( 2021 09:13 )             33.0         140  |  107  |  15  ----------------------------<  138<H>  3.9   |  26  |  0.65    Ca    7.9<L>      2021 09:13  Phos  3.0       Mg     2.1         TPro  7.0  /  Alb  3.6  /  TBili  0.2  /  DBili  x   /  AST  11  /  ALT  17  /  AlkPhos  97        Urinalysis Basic - ( 2021 22:20 )    Color: Yellow / Appearance: Clear / S.015 / pH: x  Gluc: x / Ketone: Negative  / Bili: Negative / Urobili: Negative   Blood: x / Protein: Negative / Nitrite: Negative   Leuk Esterase: Negative / RBC: x / WBC x   Sq Epi: x / Non Sq Epi: x / Bacteria: x      CAPILLARY BLOOD GLUCOSE            RADIOLOGY & ADDITIONAL TESTS:    Imaging Personally Reviewed:  YES    Consultant(s) Notes Reviewed:   YES    Care Discussed with Consultants : YES     Plan of care was discussed with patient and /or primary care giver; all questions and concerns were addressed and care was aligned with patient's wishes.

## 2021-02-24 ENCOUNTER — TRANSCRIPTION ENCOUNTER (OUTPATIENT)
Age: 36
End: 2021-02-24

## 2021-02-24 VITALS
OXYGEN SATURATION: 100 % | DIASTOLIC BLOOD PRESSURE: 52 MMHG | HEART RATE: 60 BPM | RESPIRATION RATE: 18 BRPM | TEMPERATURE: 98 F | SYSTOLIC BLOOD PRESSURE: 109 MMHG

## 2021-02-24 PROCEDURE — 85379 FIBRIN DEGRADATION QUANT: CPT

## 2021-02-24 PROCEDURE — 0225U NFCT DS DNA&RNA 21 SARSCOV2: CPT

## 2021-02-24 PROCEDURE — 87635 SARS-COV-2 COVID-19 AMP PRB: CPT

## 2021-02-24 PROCEDURE — 84480 ASSAY TRIIODOTHYRONINE (T3): CPT

## 2021-02-24 PROCEDURE — U0005: CPT

## 2021-02-24 PROCEDURE — 80307 DRUG TEST PRSMV CHEM ANLYZR: CPT

## 2021-02-24 PROCEDURE — 85025 COMPLETE CBC W/AUTO DIFF WBC: CPT

## 2021-02-24 PROCEDURE — 84100 ASSAY OF PHOSPHORUS: CPT

## 2021-02-24 PROCEDURE — 83036 HEMOGLOBIN GLYCOSYLATED A1C: CPT

## 2021-02-24 PROCEDURE — 82746 ASSAY OF FOLIC ACID SERUM: CPT

## 2021-02-24 PROCEDURE — 85027 COMPLETE CBC AUTOMATED: CPT

## 2021-02-24 PROCEDURE — 82553 CREATINE MB FRACTION: CPT

## 2021-02-24 PROCEDURE — 86140 C-REACTIVE PROTEIN: CPT

## 2021-02-24 PROCEDURE — 93306 TTE W/DOPPLER COMPLETE: CPT

## 2021-02-24 PROCEDURE — 82550 ASSAY OF CK (CPK): CPT

## 2021-02-24 PROCEDURE — 80061 LIPID PANEL: CPT

## 2021-02-24 PROCEDURE — 84436 ASSAY OF TOTAL THYROXINE: CPT

## 2021-02-24 PROCEDURE — 36415 COLL VENOUS BLD VENIPUNCTURE: CPT

## 2021-02-24 PROCEDURE — 80048 BASIC METABOLIC PNL TOTAL CA: CPT

## 2021-02-24 PROCEDURE — 84484 ASSAY OF TROPONIN QUANT: CPT

## 2021-02-24 PROCEDURE — 93005 ELECTROCARDIOGRAM TRACING: CPT

## 2021-02-24 PROCEDURE — 84443 ASSAY THYROID STIM HORMONE: CPT

## 2021-02-24 PROCEDURE — 85652 RBC SED RATE AUTOMATED: CPT

## 2021-02-24 PROCEDURE — 71046 X-RAY EXAM CHEST 2 VIEWS: CPT

## 2021-02-24 PROCEDURE — 84702 CHORIONIC GONADOTROPIN TEST: CPT

## 2021-02-24 PROCEDURE — 86769 SARS-COV-2 COVID-19 ANTIBODY: CPT

## 2021-02-24 PROCEDURE — 81003 URINALYSIS AUTO W/O SCOPE: CPT

## 2021-02-24 PROCEDURE — 99285 EMERGENCY DEPT VISIT HI MDM: CPT | Mod: 25

## 2021-02-24 PROCEDURE — 83735 ASSAY OF MAGNESIUM: CPT

## 2021-02-24 PROCEDURE — 80053 COMPREHEN METABOLIC PANEL: CPT

## 2021-02-24 PROCEDURE — 82607 VITAMIN B-12: CPT

## 2021-02-24 RX ORDER — ASPIRIN/CALCIUM CARB/MAGNESIUM 324 MG
1 TABLET ORAL
Qty: 30 | Refills: 0
Start: 2021-02-24 | End: 2021-03-25

## 2021-02-24 RX ORDER — OXYCODONE AND ACETAMINOPHEN 5; 325 MG/1; MG/1
1 TABLET ORAL EVERY 6 HOURS
Refills: 0 | Status: DISCONTINUED | OUTPATIENT
Start: 2021-02-24 | End: 2021-02-24

## 2021-02-24 RX ORDER — ACETAMINOPHEN 500 MG
650 TABLET ORAL EVERY 6 HOURS
Refills: 0 | Status: DISCONTINUED | OUTPATIENT
Start: 2021-02-24 | End: 2021-02-24

## 2021-02-24 RX ORDER — ACETAMINOPHEN 500 MG
2 TABLET ORAL
Qty: 240 | Refills: 0
Start: 2021-02-24 | End: 2021-03-25

## 2021-02-24 RX ADMIN — Medication 15 MILLIGRAM(S): at 06:13

## 2021-02-24 RX ADMIN — OXYCODONE AND ACETAMINOPHEN 1 TABLET(S): 5; 325 TABLET ORAL at 08:39

## 2021-02-24 RX ADMIN — OXYMETAZOLINE HYDROCHLORIDE 2 SPRAY(S): 0.5 SPRAY NASAL at 05:54

## 2021-02-24 RX ADMIN — Medication 600 MILLIGRAM(S): at 05:54

## 2021-02-24 RX ADMIN — Medication 81 MILLIGRAM(S): at 12:18

## 2021-02-24 RX ADMIN — ENOXAPARIN SODIUM 40 MILLIGRAM(S): 100 INJECTION SUBCUTANEOUS at 12:19

## 2021-02-24 NOTE — PROGRESS NOTE ADULT - SUBJECTIVE AND OBJECTIVE BOX
CARDIOLOGY     PROGRESS  NOTE   ________________________________________________    CHIEF COMPLAINT:Patient is a 35y old  Female who presents with a chief complaint of palpitations (23 Feb 2021 19:45)  no complain  	  REVIEW OF SYSTEMS:  CONSTITUTIONAL: No fever, weight loss, or fatigue  EYES: No eye pain, visual disturbances, or discharge  ENT:  No difficulty hearing, tinnitus, vertigo; No sinus or throat pain  NECK: No pain or stiffness  RESPIRATORY: No cough, wheezing, chills or hemoptysis; No Shortness of Breath  CARDIOVASCULAR: No chest pain, palpitations, passing out, dizziness, or leg swelling  GASTROINTESTINAL: No abdominal or epigastric pain. No nausea, vomiting, or hematemesis; No diarrhea or constipation. No melena or hematochezia.  GENITOURINARY: No dysuria, frequency, hematuria, or incontinence  NEUROLOGICAL: No headaches, memory loss, loss of strength, numbness, or tremors  SKIN: No itching, burning, rashes, or lesions   LYMPH Nodes: No enlarged glands  ENDOCRINE: No heat or cold intolerance; No hair loss  MUSCULOSKELETAL: No joint pain or swelling; No muscle, back, or extremity pain  PSYCHIATRIC: No depression, anxiety, mood swings, or difficulty sleeping  HEME/LYMPH: No easy bruising, or bleeding gums  ALLERGY AND IMMUNOLOGIC: No hives or eczema	    [ ] All others negative	  [ ] Unable to obtain    PHYSICAL EXAM:  T(C): 36.4 (02-24-21 @ 08:15), Max: 36.8 (02-23-21 @ 19:44)  HR: 60 (02-24-21 @ 08:15) (60 - 102)  BP: 110/44 (02-24-21 @ 08:15) (99/48 - 110/44)  RR: 18 (02-24-21 @ 08:15) (16 - 18)  SpO2: 100% (02-24-21 @ 08:15) (99% - 100%)  Wt(kg): --  I&O's Summary    23 Feb 2021 07:01  -  24 Feb 2021 07:00  --------------------------------------------------------  IN: 840 mL / OUT: 0 mL / NET: 840 mL        Appearance: Normal	  HEENT:   Normal oral mucosa, PERRL, EOMI	  Lymphatic: No lymphadenopathy  Cardiovascular: Normal S1 S2, No JVD, + murmurs, No edema  Respiratory: Lungs clear to auscultation	  Psychiatry: A & O x 3, Mood & affect appropriate  Gastrointestinal:  Soft, Non-tender, + BS	  Skin: No rashes, No ecchymoses, No cyanosis	  Neurologic: Non-focal  Extremities: Normal range of motion, No clubbing, cyanosis or edema  Vascular: Peripheral pulses palpable 2+ bilaterally    MEDICATIONS  (STANDING):  aspirin enteric coated 81 milliGRAM(s) Oral daily  atorvastatin 40 milliGRAM(s) Oral at bedtime  enoxaparin Injectable 40 milliGRAM(s) SubCutaneous daily  guaiFENesin  milliGRAM(s) Oral every 12 hours  metoprolol tartrate 12.5 milliGRAM(s) Oral two times a day  oxymetazoline 0.05% Nasal Spray 2 Spray(s) Both Nostrils two times a day      TELEMETRY: 	    ECG:  	  RADIOLOGY:  OTHER: 	  	  LABS:	 	    CARDIAC MARKERS:  CARDIAC MARKERS ( 23 Feb 2021 09:13 )  1.140 ng/mL / x     / x     / x     / x      CARDIAC MARKERS ( 23 Feb 2021 00:06 )  1.360 ng/mL / x     / x     / x     / x      CARDIAC MARKERS ( 22 Feb 2021 18:52 )  1.450 ng/mL / x     / 81 U/L / x     / 3.5 ng/mL  CARDIAC MARKERS ( 22 Feb 2021 12:43 )  0.793 ng/mL / x     / 91 U/L / x     / x                                    10.7   4.58  )-----------( 230      ( 23 Feb 2021 09:13 )             33.0     02-23    140  |  107  |  15  ----------------------------<  138<H>  3.9   |  26  |  0.65    Ca    7.9<L>      23 Feb 2021 09:13  Phos  3.0     02-23  Mg     2.1     02-23    TPro  7.0  /  Alb  3.6  /  TBili  0.2  /  DBili  x   /  AST  11  /  ALT  17  /  AlkPhos  97  02-22    proBNP:   Lipid Profile: Cholesterol 130  LDL --  HDL 51  TG 98    HgA1c:   TSH: Thyroid Stimulating Hormone, Serum: 3.90 uU/mL (02-23 @ 09:13)  Thyroid Stimulating Hormone, Serum: 3.59 uU/mL (02-22 @ 12:43)          Assessment and plan  ---------------------------  36 yo Female with no PMHx presenting to the ED with complaints of chest pain and palpitations that started this morning when she woke up. Patient went to urgent care and was found to have HR of 160 bpm. EMS was called and thought that she was in SVT and vagal maneuvers were performed. According to EMS report, SVT of 180 broke down to 100. Pt continues to have some chest discomfort. Patient states that last night she drank a bottle of wine which is atypical. Pt confirms drinking >3 cups of coffee daily and being under stress at work daily.   Pt denies drug use, medication, OCP use, calf swelling, smoking, or any other acute complaints   pt with no other  episodes of palpitation, no syncope, no fhx of sudden death, denies of drug use.  +fever and cold symptoms 2 days ago.  long RP tachycardia  asa daily  esr/crp noted  ?covid may repeat covid/rvp test  tsh  d dimer negative  mild increase trop, cpk normal, awaiting echo  pt with tenderness on palpation on acw yesterday  awaiting echo still          	                    CARDIOLOGY     PROGRESS  NOTE   ________________________________________________    CHIEF COMPLAINT:Patient is a 35y old  Female who presents with a chief complaint of palpitations (23 Feb 2021 19:45)  no complain  	  REVIEW OF SYSTEMS:  CONSTITUTIONAL: No fever, weight loss, or fatigue  EYES: No eye pain, visual disturbances, or discharge  ENT:  No difficulty hearing, tinnitus, vertigo; No sinus or throat pain  NECK: No pain or stiffness  RESPIRATORY: No cough, wheezing, chills or hemoptysis; No Shortness of Breath  CARDIOVASCULAR: No chest pain, palpitations, passing out, dizziness, or leg swelling  GASTROINTESTINAL: No abdominal or epigastric pain. No nausea, vomiting, or hematemesis; No diarrhea or constipation. No melena or hematochezia.  GENITOURINARY: No dysuria, frequency, hematuria, or incontinence  NEUROLOGICAL: No headaches, memory loss, loss of strength, numbness, or tremors  SKIN: No itching, burning, rashes, or lesions   LYMPH Nodes: No enlarged glands  ENDOCRINE: No heat or cold intolerance; No hair loss  MUSCULOSKELETAL: No joint pain or swelling; No muscle, back, or extremity pain  PSYCHIATRIC: No depression, anxiety, mood swings, or difficulty sleeping  HEME/LYMPH: No easy bruising, or bleeding gums  ALLERGY AND IMMUNOLOGIC: No hives or eczema	    [ ] All others negative	  [ ] Unable to obtain    PHYSICAL EXAM:  T(C): 36.4 (02-24-21 @ 08:15), Max: 36.8 (02-23-21 @ 19:44)  HR: 60 (02-24-21 @ 08:15) (60 - 102)  BP: 110/44 (02-24-21 @ 08:15) (99/48 - 110/44)  RR: 18 (02-24-21 @ 08:15) (16 - 18)  SpO2: 100% (02-24-21 @ 08:15) (99% - 100%)  Wt(kg): --  I&O's Summary    23 Feb 2021 07:01  -  24 Feb 2021 07:00  --------------------------------------------------------  IN: 840 mL / OUT: 0 mL / NET: 840 mL        Appearance: Normal	  HEENT:   Normal oral mucosa, PERRL, EOMI	  Lymphatic: No lymphadenopathy  Cardiovascular: Normal S1 S2, No JVD, + murmurs, No edema  Respiratory: Lungs clear to auscultation	  Psychiatry: A & O x 3, Mood & affect appropriate  Gastrointestinal:  Soft, Non-tender, + BS	  Skin: No rashes, No ecchymoses, No cyanosis	  Neurologic: Non-focal  Extremities: Normal range of motion, No clubbing, cyanosis or edema  Vascular: Peripheral pulses palpable 2+ bilaterally    MEDICATIONS  (STANDING):  aspirin enteric coated 81 milliGRAM(s) Oral daily  atorvastatin 40 milliGRAM(s) Oral at bedtime  enoxaparin Injectable 40 milliGRAM(s) SubCutaneous daily  guaiFENesin  milliGRAM(s) Oral every 12 hours  metoprolol tartrate 12.5 milliGRAM(s) Oral two times a day  oxymetazoline 0.05% Nasal Spray 2 Spray(s) Both Nostrils two times a day      TELEMETRY: 	    ECG:  	  RADIOLOGY:  OTHER: 	  	  LABS:	 	    CARDIAC MARKERS:  CARDIAC MARKERS ( 23 Feb 2021 09:13 )  1.140 ng/mL / x     / x     / x     / x      CARDIAC MARKERS ( 23 Feb 2021 00:06 )  1.360 ng/mL / x     / x     / x     / x      CARDIAC MARKERS ( 22 Feb 2021 18:52 )  1.450 ng/mL / x     / 81 U/L / x     / 3.5 ng/mL  CARDIAC MARKERS ( 22 Feb 2021 12:43 )  0.793 ng/mL / x     / 91 U/L / x     / x                                    10.7   4.58  )-----------( 230      ( 23 Feb 2021 09:13 )             33.0     02-23    140  |  107  |  15  ----------------------------<  138<H>  3.9   |  26  |  0.65    Ca    7.9<L>      23 Feb 2021 09:13  Phos  3.0     02-23  Mg     2.1     02-23    TPro  7.0  /  Alb  3.6  /  TBili  0.2  /  DBili  x   /  AST  11  /  ALT  17  /  AlkPhos  97  02-22    proBNP:   Lipid Profile: Cholesterol 130  LDL --  HDL 51  TG 98    HgA1c:   TSH: Thyroid Stimulating Hormone, Serum: 3.90 uU/mL (02-23 @ 09:13)  Thyroid Stimulating Hormone, Serum: 3.59 uU/mL (02-22 @ 12:43)          Assessment and plan  ---------------------------  34 yo Female with no PMHx presenting to the ED with complaints of chest pain and palpitations that started this morning when she woke up. Patient went to urgent care and was found to have HR of 160 bpm. EMS was called and thought that she was in SVT and vagal maneuvers were performed. According to EMS report, SVT of 180 broke down to 100. Pt continues to have some chest discomfort. Patient states that last night she drank a bottle of wine which is atypical. Pt confirms drinking >3 cups of coffee daily and being under stress at work daily.   Pt denies drug use, medication, OCP use, calf swelling, smoking, or any other acute complaints   pt with no other  episodes of palpitation, no syncope, no fhx of sudden death, denies of drug use.  +fever and cold symptoms 2 days ago.  long RP tachycardia  asa daily  esr/crp noted  ?covid may repeat covid/rvp test  tsh  d dimer negative  mild increase trop, cpk normal, awaiting echo  pt with tenderness on palpation on acw yesterday  echo results noted and discussed with HO prior to dc  pt dc to fu with me in 2 weeks  pt left before i see the pt

## 2021-02-24 NOTE — PROGRESS NOTE ADULT - ASSESSMENT
36 yo Female with no PMHx presenting to the ED with complaints of chest pain and palpitations. 
                                            M E D I C A L   A T T E N D I N G    F O L L O W    U P   N O T E  (21 )                                     ------------------------------------------------------------------------------------------------    patient evaluated by me, case discussed with team, chart, medications, and physical exam reviewed, labs / tests  and vitals reviewed by me, as bellow.   Patient is stable for discharge today.  Patient to follow up with  cardio, PMD  See discharge document for full note.                                           ( note written   21 )    ==================>> MEDICATIONS <<====================    aspirin enteric coated 81 milliGRAM(s) Oral daily  atorvastatin 40 milliGRAM(s) Oral at bedtime  enoxaparin Injectable 40 milliGRAM(s) SubCutaneous daily  guaiFENesin  milliGRAM(s) Oral every 12 hours  metoprolol tartrate 12.5 milliGRAM(s) Oral two times a day  oxymetazoline 0.05% Nasal Spray 2 Spray(s) Both Nostrils two times a day    MEDICATIONS  (PRN):  acetaminophen   Tablet .. 650 milliGRAM(s) Oral every 6 hours PRN Temp greater or equal to 38C (100.4F), Mild Pain (1 - 3)  ketorolac   Injectable 15 milliGRAM(s) IV Push every 6 hours PRN Severe Pain (7 - 10)  oxycodone    5 mG/acetaminophen 325 mG 1 Tablet(s) Oral every 6 hours PRN Moderate Pain (4 - 6)    ___________  Active diet:  Diet, Regular  ___________________    ==================>> VITAL SIGNS <<==================    T(C): 36.6 (21 @ 11:26), Max: 36.8 (21 @ 19:44)  HR: 60 (21 @ 11:26) (60 - 102)  BP: 109/52 (21 @ 11:26) (99/48 - 110/44)  BP(mean): --  RR: 18 (21 @ 11:26) (18 - 18)  SpO2: 100% (21 @ 11:26) (99% - 100%)     I&O's Summary    2021 07:01  -  2021 07:00  --------------------------------------------------------  IN: 840 mL / OUT: 0 mL / NET: 840 mL       ==================>> LAB AND IMAGING <<==================                        10.7   4.58  )-----------( 230      ( 2021 09:13 )             33.0        -    140  |  107  |  15  ----------------------------<  138<H>  3.9   |  26  |  0.65    Ca    7.9<L>      2021 09:13  Phos  3.0     -  Mg     2.1     -23             CARDIAC MARKERS ( 2021 09:13 )  1.140 ng/mL / x     / x     / x     / x      CARDIAC MARKERS ( 2021 00:06 )  1.360 ng/mL / x     / x     / x     / x      CARDIAC MARKERS ( 2021 18:52 )  1.450 ng/mL / x     / 81 U/L / x     / 3.5 ng/mL       Urinalysis Basic - ( 2021 22:20 )  Color: Yellow / Appearance: Clear / S.015 / pH: x  Gluc: x / Ketone: Negative  / Bili: Negative / Urobili: Negative   Blood: x / Protein: Negative / Nitrite: Negative   Leuk Esterase: Negative / RBC: x / WBC x   Sq Epi: x / Non Sq Epi: x / Bacteria: x    TSH:      3.90   (21)       ,     3.59   (21)           Lipid profile:  (21)     Total: 130     LDL  : (p)     HDL  :51     TG   :98     HgA1C:   (21)          (21)      5.0  WBC count:   4.58 <<== ,  7.18 <<==   Hemoglobin:   10.7 <<==,  11.8 <<==  platelets:  230 <==, 282 <==    Creatinine:  0.65  <<==, 0.62  <<==  Sodium:   140  <==, 140  <==       AST:          11 <==      ALT:        17  <==      AP:        97  <=     Bili:        0.2  <=     < from: Transthoracic Echocardiogram (21 @ 07:31) >  CONCLUSIONS:  1. Trace mitral regurgitation.  2. Normal left ventricular internal dimensions and wall  thicknesses.  3. Endocardium not well visualized; grossly normal left  ventricular systolic function.  4. Normal right ventricular size and function.  ------------------------------------------------------------------------  Confirmed on  2021 - 10:43:14 by Federico Jacobson MD  < end of copied text >    
  _________________________________________________________________________________________  ========>>  M E D I C A L   A T T E N D I N G    F O L L O W  U P  N O T E  <<=========  -----------------------------------------------------------------------------------------------------    - Patient seen and examined by me earlier today.   - In summary,  LEANNA LEMUS is a 35y year old woman admitted with SVT, palpitations   - Patient today overall doing ok, comfortable, eating OK.     ==================>> REVIEW OF SYSTEM <<=================    GEN: no fever, no chills, no pain  RESP: no SOB at rest, no cough, no sputum  CVS: no chest pain, no palpitations, no edema  GI: no abdominal pain, no nausea, no constipation, no diarrhea  : no dysuria, no frequency, no hematuria  Neuro: no headache, no dizziness  Derm : no itching, no rash    ==================>> PHYSICAL EXAM <<=================    GEN: A&O X 3 , NAD , comfortable  HEENT: NCAT, PERRL, MMM, hearing intact  Neck: supple , no JVD appreciated  CVS: S1S2 , regular , No M/R/G appreciated  PULM: CTA B/L,  no W/R/R appreciated  ABD.: soft. non tender, non distended,  bowel sounds present  Extrem: intact pulses , no edema   PSYCH : normal mood,  not anxious                                         ( Note Written:  21 )    ==================>> MEDICATIONS <<====================    MEDICATIONS  (STANDING):  aspirin enteric coated 81 milliGRAM(s) Oral daily  atorvastatin 40 milliGRAM(s) Oral at bedtime  enoxaparin Injectable 40 milliGRAM(s) SubCutaneous daily  guaiFENesin  milliGRAM(s) Oral every 12 hours  metoprolol tartrate 12.5 milliGRAM(s) Oral two times a day  oxymetazoline 0.05% Nasal Spray 2 Spray(s) Both Nostrils two times a day  sodium chloride 0.9%. 1000 milliLiter(s) (70 mL/Hr) IV Continuous <Continuous>    MEDICATIONS  (PRN):  ketorolac   Injectable 15 milliGRAM(s) IV Push every 6 hours PRN Severe Pain (7 - 10)    ___________  Active diet:  Diet, Regular  ___________________    ==================>> VITAL SIGNS <<==================    T(C): 36.8 (21 @ 19:44), Max: 36.8 (21 @ 23:05)  HR: 70 (21 @ 19:44) (64 - 82)  BP: 99/48 (21 @ 19:44) (93/56 - 107/62)  RR: 18 (21 @ 19:44) (16 - 18)  SpO2: 99% (21 @ 19:44) (99% - 100%)     I&O's Summary    2021 07:01  -  2021 07:00  --------------------------------------------------------  IN: 560 mL / OUT: 0 mL / NET: 560 mL    2021 07:01  -  2021 22:08  --------------------------------------------------------  IN: 840 mL / OUT: 0 mL / NET: 840 mL    no significant events noted on tele      ==================>> LAB AND IMAGING <<==================                        10.7   4.58  )-----------( 230      ( 2021 09:13 )             33.0        02-23    140  |  107  |  15  ----------------------------<  138<H>  3.9   |  26  |  0.65    Ca    7.9<L>      2021 09:13  Phos  3.0     02-23  Mg     2.1         TPro  7.0  /  Alb  3.6  /  TBili  0.2  /  DBili  x   /  AST  11  /  ALT  17  /  AlkPhos  97  02-22                CARDIAC MARKERS ( 2021 09:13 )  1.140 ng/mL / x     / x     / x     / x      CARDIAC MARKERS ( 2021 00:06 )  1.360 ng/mL / x     / x     / x     / x      CARDIAC MARKERS ( 2021 18:52 )  1.450 ng/mL / x     / 81 U/L / x     / 3.5 ng/mL  CARDIAC MARKERS ( 2021 12:43 )  0.793 ng/mL / x     / 91 U/L / x     / x           Urinalysis Basic - ( 2021 22:20 )  Color: Yellow / Appearance: Clear / S.015 / pH: x  Gluc: x / Ketone: Negative  / Bili: Negative / Urobili: Negative   Blood: x / Protein: Negative / Nitrite: Negative   Leuk Esterase: Negative / RBC: x / WBC x   Sq Epi: x / Non Sq Epi: x / Bacteria: x    TSH:      3.90   (21)       ,     3.59   (21)           Lipid profile:  (21)     Total: 130     LDL  : (p)     HDL  :51     TG   :98     HgA1C:   (21)          (21)      5.0    pending ECHO !  ___________________________________________________________________________________  ===============>>  A S S E S S M E N T   A N D   P L A N <<===============  ------------------------------------------------------------------------------------------    History of Present Illness:   36 yo Female with no PMHx presenting to the ED with complaints of chest pain and palpitations that started this morning when she woke up. Patient went to urgent care and was found to have HR of 160 bpm. EMS was called and thought that she was in SVT and vagal maneuvers were performed.   According to EMS report, SVT of 180 broke down to 100    Problem/Plan - 1:  ·  Problem: SVT   - HR as per EMS report was 180 which broke after vagal maneuver   - unknown etiology   - no concern for infection at this time   - cw telemonitoring   - elevated tropponin levels likely from the event and not an MI/ NSTEMI  -EKG showed NSR in ER  - ASA, Lipitor + BB intiated   - follow TTE   -all other blood.durine tests negative so far  - Cardiology following  - will likely nee further monitoring/ halter/event monitor as OP ?  .  -GI/DVT Prophylaxis per protocol.    --------------------------------------------  Case discussed with pt, HS  Education given on findings and plan of care  ___________________________  H. ANSON Aguilar.  Pager: [phone]

## 2021-02-24 NOTE — DISCHARGE NOTE NURSING/CASE MANAGEMENT/SOCIAL WORK - PATIENT PORTAL LINK FT
You can access the FollowMyHealth Patient Portal offered by [facility] by registering at the following website: [email or web address]. By joining [facility]’s FollowMyHealth portal, you will also be able to view your health information using other applications (apps) compatible with our system.

## 2021-03-24 NOTE — ED ADULT NURSE NOTE - PRIMARY CARE PROVIDER
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, which appears spontaneous vs ?underlying collagen vascular disorder  Plan:  - Heparin drip as per neurosurgery  - Aspirin 81 mg daily  - Lipitor 80 mg daily  - Would recommend avoiding morphine for headaches, instead Dexamethasone 2mg prn ordered or prn Acetaminophen   - Repeat CTA per Neurosurgery in 1 week, at that time they will consider transition to DAPT  - Goal of normotension   - Continue telemetry  - PT/OT  - Ok to decrease neuro checks to q 4hours  - Medical management as per primary team  - Outpatient vascular neurology follow-up in 2-4 weeks  Appointment requested  Results:  - MRI brain without contrast revealed acute to early subacute infarcts in the bilateral posterior parietal lobes and right cerebellar hemisphere  - MRA head and neck with absence of signal within the distal cervical and proximal intradural segment of the left vertebral artery compatible with findings of signal abnormality on the MRI concerning for thrombus or dissection  Multifocal irregularity of the distal intradural segment of the right vertebral artery    - Initial CT head negative for acute abnormality   - Repeat CT head stable, negative for acute abnormality   - CTA head and neck revealed left vertebral artery dissection likely originating at approximately C2 level and extending just proximally to the vertebrobasilar junction, multifocal progressive narrowing of the distal intradural segment of the right vertebral artery and mild narrowing in the proximal basilar artery, and multifocal mild to moderate narrowing in the left PCA  - Renal artery ultrasound:  Negative for significant arterial occlusive disease  Patent renal veins bilaterally   - TTE: EF 70% with No RWMA, no thrombus, normal sized bilateral atria  - TSH elevated, T4 normal, , A1C 5 3    Subjective: On exam, the patient states she is doing well  She feels much better today  She did state that yesterday she was not feeling great and then went for a walk and following the walk had another episode" described as headache, swirly peripheral vision, lightheadedness and spinning  The patient was given morphine for her headache  She states that slowly subsided  Repeat CT head was completed at that time which was negative for acute abnormality  Renal artery ultrasound was also completed which was normal     Currently, the patient states she is asymptomatic though she does admit to being nervous secondary to recent events  She denies chest pain, shortness of breath, weakness or numbness of arms or legs  She does continue to admit minimal clumsiness mostly noticed when attempting to complete a task with either of her upper extremities  No clumsiness appreciated by her with ambulation  Patient's mom at bedside  Both patient and mom's questions were answered in entirety  Today's PT is 74  Labile BP, today ranging from 94/51 toe 145/92  Labs otherwise unremarkable        Past Medical History:   Diagnosis Date    Cervical dysplasia     GERD (gastroesophageal reflux disease)     occasionally    Hypertension     controlling with diet and exercise    Uterine leiomyoma      Past Surgical History:   Procedure Laterality Date    AUGMENTATION MAMMAPLASTY      GYNECOLOGIC CRYOSURGERY      CERVIX    HYSTERECTOMY      LASER ABLATION OF CONDYLOMAS N/A 7/30/2018    Procedure: Esta Una ABLATION OF VULVA;  Surgeon: Mat Lan MD;  [facility];  Service: Gynecology Oncology    SKIN TAG REMOVAL      EXICISON OF PERIANAL    WISDOM TOOTH EXTRACTION       Family history:  Family History   Problem Relation Age of Onset    Adopted: Yes       Social History     Social History    Marital status: /Civil Union     Spouse name: N/A    Number of children: N/A    Years of education: N/A     Social History Main Topics    Smoking status: Never Smoker    Smokeless tobacco: Never Used    Alcohol use Yes      Comment: SOCIAL-1-2 drinks per week    Drug use: No    Sexual activity: Not Asked     Other Topics Concern    None     Social History Narrative    PROBLEMS CONCERNING ADOPTED CHILD    CAFFEINE USE    MODERATE EXERCISING LESS THAN 3x WEEK       Scheduled Meds:  Current Facility-Administered Medications:  acetaminophen 650 mg Oral Q6H PRN Leigha Madseni, DO    amLODIPine 10 mg Oral Daily Negra Llanes,     aspirin 81 mg Oral Daily Silvia Mulligan PA-C    atorvastatin 80 mg Oral Daily With Katie Welch, DO    heparin (porcine) 3-20 Units/kg/hr (Order-Specific) Intravenous Titrated Mary Beth Ch MD Last Rate: 14 Units/kg/hr (02/06/19 0038)   hydrALAZINE 10 mg Intravenous Q6H PRN Mary Beth Ch MD    hydrochlorothiazide 12 5 mg Oral Daily Negra Llanes DO    metoclopramide 10 mg Intravenous Q8H PRN Leigha Rosen, DO    potassium chloride 40 mEq Oral Once Mary Beth Ch MD    potassium-sodium phosphateS 1 tablet Oral 4x Daily (with meals and at bedtime) Mary Beth Ch MD      Continuous Infusions:  heparin (porcine) 3-20 Units/kg/hr (Order-Specific) Last Rate: 14 Units/kg/hr (02/06/19 0038)     PRN Meds:   acetaminophen    hydrALAZINE    metoclopramide    ROS: A 12 point ROS was completed and other than the above mentioned complaints in the HPI, all remaining systems were negative       Vitals: /73   Pulse 58   Temp 99 1 °F (37 3 °C) (Oral)   Resp 16   Ht 4' 11" (1 499 m)   Wt 54 3 kg (119 lb 11 4 oz)   SpO2 97%   BMI 24 18 kg/m²     Physical Exam:   Physical Exam   Constitutional: She is oriented to person, place, and time  She appears well-developed and well-nourished  No distress  Seated in bedside chair   HENT:   Head: Normocephalic and atraumatic  Eyes: Pupils are equal, round, and reactive to light  Conjunctivae and EOM are normal  Right eye exhibits no discharge  Left eye exhibits no discharge  No scleral icterus  Neck: Normal range of motion  Neck supple  Cardiovascular: Normal rate and regular rhythm  Exam reveals no gallop and no friction rub  No murmur heard  Pulmonary/Chest: Effort normal and breath sounds normal  No stridor  No respiratory distress  She has no wheezes  She has no rales  She exhibits no tenderness  Musculoskeletal: Normal range of motion  She exhibits no edema, tenderness or deformity  Lymphadenopathy:     She has no cervical adenopathy  Neurological: She is alert and oriented to person, place, and time  She has normal strength  Skin: Skin is warm and dry  No rash noted  No erythema  Psychiatric: She has a normal mood and affect  Her speech is normal and behavior is normal  Judgment and thought content normal      Neurologic Exam     Mental Status   Oriented to person, place, and time  Follows 2 step commands  Attention: normal  Concentration: normal    Speech: speech is normal (No evidence of dysarthria or aphasia)  Level of consciousness: alert  Knowledge: good  Normal comprehension  Cranial Nerves     CN II   Visual acuity: normal    CN III, IV, VI   Pupils are equal, round, and reactive to light  Extraocular motions are normal    Nystagmus: none   Diplopia: none  Ophthalmoparesis: none  Conjugate gaze: present    CN VII   Facial expression full, symmetric  CN XII   CN XII normal      Motor Exam   Muscle bulk: normal  Overall muscle tone: normal    Strength   Strength 5/5 throughout       Gait, Coordination, and Reflexes   Rapid alternating movements intact  Minimal clumsiness on left upper compared to right, though IV and oxygen probe are on this hand         Labs:  Recent Results (from the past 24 hour(s))   Protime-INR    Collection Time: 02/05/19  5:27 PM   Result Value Ref Range    Protime 14 2 11 8 - 14 2 seconds    INR 1 09 0 86 - 1 17   APTT six (6) hours after Heparin bolus/drip initiation or dosing change    Collection Time: 02/05/19  5:27 PM   Result Value Ref Range    PTT 47 (H) 26 - 38 seconds   APTT    Collection Time: 02/05/19 11:38 PM   Result Value Ref Range    PTT 62 (H) 26 - 38 seconds   CBC and differential    Collection Time: 02/06/19  5:58 AM   Result Value Ref Range    WBC 6 08 4 31 - 10 16 Thousand/uL    RBC 4 46 3 81 - 5 12 Million/uL    Hemoglobin 13 9 11 5 - 15 4 g/dL    Hematocrit 41 4 34 8 - 46 1 %    MCV 93 82 - 98 fL    MCH 31 2 26 8 - 34 3 pg    MCHC 33 6 31 4 - 37 4 g/dL    RDW 12 0 11 6 - 15 1 %    MPV 11 0 8 9 - 12 7 fL    Platelets 224 967 - 303 Thousands/uL    nRBC 0 /100 WBCs    Neutrophils Relative 51 43 - 75 %    Immat GRANS % 0 0 - 2 %    Lymphocytes Relative 38 14 - 44 %    Monocytes Relative 9 4 - 12 %    Eosinophils Relative 2 0 - 6 %    Basophils Relative 0 0 - 1 %    Neutrophils Absolute 3 09 1 85 - 7 62 Thousands/µL    Immature Grans Absolute 0 01 0 00 - 0 20 Thousand/uL    Lymphocytes Absolute 2 32 0 60 - 4 47 Thousands/µL    Monocytes Absolute 0 52 0 17 - 1 22 Thousand/µL    Eosinophils Absolute 0 12 0 00 - 0 61 Thousand/µL    Basophils Absolute 0 02 0 00 - 0 10 Thousands/µL   Basic metabolic panel    Collection Time: 02/06/19  5:58 AM   Result Value Ref Range    Sodium 138 136 - 145 mmol/L    Potassium 3 5 3 5 - 5 3 mmol/L    Chloride 106 100 - 108 mmol/L    CO2 26 21 - 32 mmol/L    ANION GAP 6 4 - 13 mmol/L    BUN 12 5 - 25 mg/dL    Creatinine 0 68 0 60 - 1 30 mg/dL    Glucose 95 65 - 140 mg/dL    Calcium 8 4 8 3 - 10 1 mg/dL    eGFR 104 ml/min/1 73sq m   Phosphorus    Collection Time: 02/06/19  5:58 AM   Result Value Ref Range    Phosphorus 3 6 2 7 - 4 5 mg/dL   Magnesium    Collection Time: 02/06/19  5:58 AM   Result Value Ref Range    Magnesium 2 4 1 6 - 2 6 mg/dL   APTT    Collection Time: 02/06/19  5:58 AM   Result Value Ref Range    PTT 74 (H) 26 - 38 seconds     Imaging: I have personally reviewed pertinent imaging and PACS reports       VTE Prophylaxis: Heparin Abraham  Rolan #[id number]/patient unknown
